# Patient Record
Sex: FEMALE | Race: ASIAN | NOT HISPANIC OR LATINO | Employment: OTHER | URBAN - METROPOLITAN AREA
[De-identification: names, ages, dates, MRNs, and addresses within clinical notes are randomized per-mention and may not be internally consistent; named-entity substitution may affect disease eponyms.]

---

## 2017-02-20 ENCOUNTER — TRANSCRIBE ORDERS (OUTPATIENT)
Dept: ADMINISTRATIVE | Facility: HOSPITAL | Age: 62
End: 2017-02-20

## 2017-02-20 DIAGNOSIS — Z12.31 VISIT FOR SCREENING MAMMOGRAM: Primary | ICD-10-CM

## 2017-03-09 ENCOUNTER — APPOINTMENT (OUTPATIENT)
Dept: MAMMOGRAPHY | Facility: HOSPITAL | Age: 62
End: 2017-03-09

## 2017-03-21 ENCOUNTER — APPOINTMENT (OUTPATIENT)
Dept: MAMMOGRAPHY | Facility: HOSPITAL | Age: 62
End: 2017-03-21

## 2017-03-21 ENCOUNTER — HOSPITAL ENCOUNTER (OUTPATIENT)
Dept: BONE DENSITY | Facility: HOSPITAL | Age: 62
Discharge: HOME | End: 2017-03-21
Admitting: FAMILY MEDICINE

## 2017-03-21 DIAGNOSIS — M81.0 OSTEOPOROSIS: ICD-10-CM

## 2017-03-21 PROCEDURE — 77080 DXA BONE DENSITY AXIAL: CPT

## 2017-03-21 PROCEDURE — 77080 DXA BONE DENSITY AXIAL: CPT | Performed by: RADIOLOGY

## 2017-06-08 ENCOUNTER — TRANSCRIBE ORDERS (OUTPATIENT)
Dept: ADMINISTRATIVE | Facility: HOSPITAL | Age: 62
End: 2017-06-08

## 2017-06-08 DIAGNOSIS — Z12.31 VISIT FOR SCREENING MAMMOGRAM: Primary | ICD-10-CM

## 2017-08-09 ENCOUNTER — HOSPITAL ENCOUNTER (OUTPATIENT)
Dept: MAMMOGRAPHY | Facility: HOSPITAL | Age: 62
Discharge: HOME | End: 2017-08-09
Admitting: FAMILY MEDICINE

## 2017-08-09 DIAGNOSIS — Z12.31 VISIT FOR SCREENING MAMMOGRAM: ICD-10-CM

## 2017-08-09 PROCEDURE — G0202 SCR MAMMO BI INCL CAD: HCPCS

## 2017-08-09 PROCEDURE — 77067 SCR MAMMO BI INCL CAD: CPT | Performed by: RADIOLOGY

## 2017-08-09 PROCEDURE — 77063 BREAST TOMOSYNTHESIS BI: CPT

## 2017-08-09 PROCEDURE — 77063 BREAST TOMOSYNTHESIS BI: CPT | Performed by: RADIOLOGY

## 2018-06-28 PROBLEM — R73.03 PRE-DIABETES: Status: ACTIVE | Noted: 2018-06-28

## 2018-06-28 PROBLEM — E78.5 HYPERLIPIDEMIA: Status: ACTIVE | Noted: 2018-06-28

## 2018-06-28 PROBLEM — I10 ESSENTIAL HYPERTENSION: Status: ACTIVE | Noted: 2018-06-28

## 2018-06-28 PROBLEM — R07.9 CHEST PAIN: Status: ACTIVE | Noted: 2018-06-28

## 2018-06-28 PROBLEM — E66.9 CLASS 1 OBESITY IN ADULT: Status: ACTIVE | Noted: 2018-06-28

## 2018-06-28 PROBLEM — E66.811 CLASS 1 OBESITY IN ADULT: Status: ACTIVE | Noted: 2018-06-28

## 2018-06-28 PROBLEM — F32.A DEPRESSION: Status: ACTIVE | Noted: 2018-06-28

## 2018-06-28 PROBLEM — F41.9 ANXIETY DISORDER: Status: ACTIVE | Noted: 2018-06-28

## 2018-06-28 NOTE — PROGRESS NOTES
Subjective   Toby Hoffman is a 63 y.o. female.  Hayden Curry MD  No referring provider defined for this encounter.      No chief complaint on file.      Patient Active Problem List    Diagnosis   • Essential hypertension [I10]   • Hyperlipidemia [E78.5]   • Pre-diabetes [R73.03]   • Class 1 obesity in adult [E66.9]   • Anxiety disorder [F41.9]   • Depression [F32.9]        History of Present Illness Patient is a 63 year old female of [place] descent who is referred to me for cardiac evaluation in the setting of multiple cardiovascular risk factors.  She has had hypertension and dyslipidemia which has been well treated by PCP.  She also has a family history of coronary artery disease as her father  of a heart attack in his 70s and older sister has stable angina.    Patient is herself fairly active, she walks up to an hour every evening, states that sometimes her walk is fast and sometimes just leisurely.  She also remains active and busy with household chores.  She has no complaints of chest pain or shortness of breath at rest or with exertion.  She has no complaints of heartburn or other angina-like symptoms.  She has no complaints of palpitations dizziness lightheadedness or syncope.  No abdominal pain nausea vomiting diarrhea constipation or urinary symptoms.  No symptoms of stroke.  All other review of systems are negative.    She has anxiety which is well controlled on current medications.  She takes when necessary benzodiazepines for insomnia.      History reviewed.  She is status post hysterectomy.      The following portions of the patient's history were reviewed and updated as appropriate: allergies, current medications, past family history, past medical history, past social history, past surgical history and problem list.    No Known Allergies      Current Outpatient Prescriptions:   •  aspirin 81 MG EC tablet, Take 81 mg by mouth Daily., Disp: , Rfl:   •  atorvastatin (LIPITOR) 10 MG tablet,  "10 mg Daily., Disp: , Rfl:   •  citalopram (CeleXA) 10 MG tablet, 10 mg., Disp: , Rfl:   •  clonazePAM (KlonoPIN) 0.5 MG tablet, 0.5 mg Daily., Disp: , Rfl:   •  lisinopril (PRINIVIL,ZESTRIL) 10 MG tablet, 5 mg Daily., Disp: , Rfl:     Review of Systems   Constitution: Negative.   HENT: Negative.    Eyes: Negative.    Cardiovascular: Negative for chest pain and dyspnea on exertion.   Respiratory: Negative.    Endocrine: Negative.    Hematologic/Lymphatic: Negative.    Skin: Negative.    Musculoskeletal: Negative.    Gastrointestinal: Negative.    Genitourinary: Negative.    Neurological: Negative.    Psychiatric/Behavioral: Negative.    Allergic/Immunologic: Negative.    All other systems reviewed and are negative.      Social History     Social History   • Marital status:      Spouse name: N/A   • Number of children: N/A   • Years of education: N/A     Occupational History   • Not on file.     Social History Main Topics   • Smoking status: Never Smoker   • Smokeless tobacco: Never Used   • Alcohol use No   • Drug use: No   • Sexual activity: Defer     Other Topics Concern   • Not on file     Social History Narrative   • No narrative on file       Family History   Problem Relation Age of Onset   • Breast cancer Neg Hx    • Ovarian cancer Neg Hx        Objective      Blood pressure 116/78, pulse 70, height 160 cm (63\"), weight 77.7 kg (171 lb 6.4 oz).    Physical Exam   Constitutional: She is oriented to person, place, and time. She appears well-developed and well-nourished. No distress.   HENT:   Head: Normocephalic and atraumatic.   Mouth/Throat: Oropharynx is clear and moist.   Eyes: Pupils are equal, round, and reactive to light. No scleral icterus.   Neck: Neck supple. No JVD present. No tracheal deviation present. No thyromegaly present.   Cardiovascular: Normal rate, regular rhythm and normal heart sounds.  Exam reveals no gallop and no friction rub.    No murmur heard.  Pulmonary/Chest: Effort normal " and breath sounds normal. No respiratory distress. She has no wheezes. She has no rales.   Abdominal: Soft. Bowel sounds are normal. She exhibits no distension and no mass. There is no tenderness. There is no rebound and no guarding.   Musculoskeletal: Normal range of motion. She exhibits no edema or deformity.   Lymphadenopathy:     She has no cervical adenopathy.   Neurological: She is alert and oriented to person, place, and time. No cranial nerve deficit.   Skin: Skin is warm and dry. No rash noted. She is not diaphoretic.   Psychiatric: She has a normal mood and affect.   Nursing note and vitals reviewed.        ECG 12 Lead  Date/Time: 6/28/2018 3:29 PM  Performed by: RADHA MURPHY  Authorized by: RADHA MURPHY   Previous ECG: no previous ECG available  Rhythm: sinus rhythm  Rate: normal  Conduction: conduction normal  ST Segments: ST segments normal  T Waves: T waves normal  QRS axis: normal  Clinical impression: normal ECG            Lab Review:   From primary care dated February 20, 2018  Total cholesterol 158, triglycerides 104, HDL 68, LDL 69    Hemoglobin A1c 6.1    TSH 4.23    Vitamin B12 531    Magnesium 2    CBC: WBCs 5.4, hemoglobin 0.8, hematocrit 35.1, platelets 392  Rest of CBC normal    Assessment:   Diagnosis Plan   1. Essential hypertension  Well controlled on current medications.     2. Mixed hyperlipidemia  Well controlled on current medications.     3. Pre-diabetes  Managed with diet and axis size.       Plan:  1. Continue current medications, diet and exercise for management of risk factors.  2. With fair exercise capacity and in the absence of any cardiac symptoms I do not see the need for further testing.  3. She can follow-up with PCP for further management.  4. Due to risk factors she was educated about the symptoms of angina, heart attack and CHF etc. and was advised to seek further consultation if she were to experience any new symptoms.  5. Follow-up as needed.  6. Thank you for  allowing us to participate in the care of your patient.     Scribed for Humberto Suarez MD by Jayden Wise. 6/29/2018  10:51 AM     I, Humberto Suarez MD, personally performed the services described in this documentation as scribed by the above named individual in my presence, and it is both accurate and complete.  6/29/2018  10:57 AM

## 2018-06-29 ENCOUNTER — CONSULT (OUTPATIENT)
Dept: CARDIOLOGY | Facility: CLINIC | Age: 63
End: 2018-06-29

## 2018-06-29 VITALS
HEIGHT: 63 IN | BODY MASS INDEX: 30.37 KG/M2 | WEIGHT: 171.4 LBS | DIASTOLIC BLOOD PRESSURE: 78 MMHG | HEART RATE: 70 BPM | SYSTOLIC BLOOD PRESSURE: 116 MMHG

## 2018-06-29 DIAGNOSIS — E78.2 MIXED HYPERLIPIDEMIA: ICD-10-CM

## 2018-06-29 DIAGNOSIS — I10 ESSENTIAL HYPERTENSION: Primary | ICD-10-CM

## 2018-06-29 DIAGNOSIS — R73.03 PRE-DIABETES: ICD-10-CM

## 2018-06-29 PROCEDURE — 93000 ELECTROCARDIOGRAM COMPLETE: CPT | Performed by: INTERNAL MEDICINE

## 2018-06-29 PROCEDURE — 99214 OFFICE O/P EST MOD 30 MIN: CPT | Performed by: INTERNAL MEDICINE

## 2018-06-29 RX ORDER — CITALOPRAM 10 MG/1
10 TABLET ORAL DAILY
COMMUNITY
Start: 2018-05-14 | End: 2018-12-04 | Stop reason: HOSPADM

## 2018-06-29 RX ORDER — ATORVASTATIN CALCIUM 10 MG/1
10 TABLET, FILM COATED ORAL NIGHTLY
COMMUNITY
Start: 2018-05-14

## 2018-06-29 RX ORDER — CLONAZEPAM 0.5 MG/1
0.5 TABLET ORAL DAILY
COMMUNITY
Start: 2018-05-10 | End: 2018-11-29

## 2018-06-29 RX ORDER — ASPIRIN 81 MG/1
81 TABLET ORAL DAILY
COMMUNITY
End: 2022-05-02

## 2018-06-29 RX ORDER — LISINOPRIL 10 MG/1
10 TABLET ORAL DAILY
COMMUNITY
Start: 2018-05-14

## 2018-08-30 ENCOUNTER — TRANSCRIBE ORDERS (OUTPATIENT)
Dept: ADMINISTRATIVE | Facility: HOSPITAL | Age: 63
End: 2018-08-30

## 2018-08-30 DIAGNOSIS — Z12.31 VISIT FOR SCREENING MAMMOGRAM: Primary | ICD-10-CM

## 2018-10-05 ENCOUNTER — HOSPITAL ENCOUNTER (OUTPATIENT)
Dept: MAMMOGRAPHY | Facility: HOSPITAL | Age: 63
Discharge: HOME | End: 2018-10-05
Admitting: FAMILY MEDICINE

## 2018-10-05 DIAGNOSIS — Z12.31 VISIT FOR SCREENING MAMMOGRAM: ICD-10-CM

## 2018-10-05 PROCEDURE — 77063 BREAST TOMOSYNTHESIS BI: CPT

## 2018-10-05 PROCEDURE — 77067 SCR MAMMO BI INCL CAD: CPT

## 2018-10-05 PROCEDURE — 77063 BREAST TOMOSYNTHESIS BI: CPT | Performed by: RADIOLOGY

## 2018-10-05 PROCEDURE — 77067 SCR MAMMO BI INCL CAD: CPT | Performed by: RADIOLOGY

## 2018-11-16 ENCOUNTER — HOSPITAL ENCOUNTER (EMERGENCY)
Facility: HOSPITAL | Age: 63
Discharge: HOME | End: 2018-11-16
Attending: EMERGENCY MEDICINE | Admitting: EMERGENCY MEDICINE

## 2018-11-16 VITALS
HEIGHT: 61 IN | TEMPERATURE: 98.4 F | RESPIRATION RATE: 16 BRPM | HEART RATE: 84 BPM | BODY MASS INDEX: 32.47 KG/M2 | OXYGEN SATURATION: 96 % | SYSTOLIC BLOOD PRESSURE: 152 MMHG | DIASTOLIC BLOOD PRESSURE: 84 MMHG | WEIGHT: 172 LBS

## 2018-11-16 DIAGNOSIS — I10 ESSENTIAL HYPERTENSION: Primary | ICD-10-CM

## 2018-11-16 DIAGNOSIS — F41.9 ANXIETY: ICD-10-CM

## 2018-11-16 LAB
HOLD SPECIMEN: NORMAL
HOLD SPECIMEN: NORMAL
WHOLE BLOOD HOLD SPECIMEN: NORMAL
WHOLE BLOOD HOLD SPECIMEN: NORMAL

## 2018-11-16 PROCEDURE — 99283 EMERGENCY DEPT VISIT LOW MDM: CPT

## 2018-11-16 RX ORDER — LOSARTAN POTASSIUM 25 MG/1
25 TABLET ORAL 2 TIMES DAILY
Qty: 60 TABLET | Refills: 0 | Status: SHIPPED | OUTPATIENT
Start: 2018-11-16 | End: 2018-11-29

## 2018-11-16 RX ORDER — SODIUM CHLORIDE 0.9 % (FLUSH) 0.9 %
10 SYRINGE (ML) INJECTION AS NEEDED
Status: DISCONTINUED | OUTPATIENT
Start: 2018-11-16 | End: 2018-11-16 | Stop reason: HOSPADM

## 2018-11-16 NOTE — ED PROVIDER NOTES
"    [facility] EMERGENCY DEPARTMENT    eMERGENCY dEPARTMENT eNCOUnter      Pt Name: Toby Hoffman  MRN: [id number]  YOB: 1955  Date of evaluation: 11/16/2018  Provider: Nic Marcum DO    CHIEF COMPLAINT       Chief Complaint   Patient presents with   • Hypertension         HISTORY OF PRESENT ILLNESS  (Location/Symptom, Timing/Onset, Context/Setting, Quality, Duration, Modifying Factors, Severity.)   Toby Hoffman is a 63 y.o. female who presents to the emergency department accompanied by family for evaluation of anxiety and elevated blood pressure readings over the last five to six days. She was started on 10 mg of Celexa by her primary care provider in addition to her regular blood pressure medications (10 mg of Lisinopril and 25 mg of Losartan 25 mg daily) but has noticed blood pressure readings as high as 147/107 and 158/111 at home. Starting yesterday she began complaining of a \"tickling\" sensation over the upper lip and across the forehead. She had also been complaining of sleep disturbances to family who then brought her here for evaluation. There are no other acute complaints at this time.      Nursing notes were reviewed.    REVIEW OF SYSTEMS    (2-9 systems for level 4, 10 or more for level 5)   ROS:  General:  No fevers, no chills, no weakness  Cardiovascular:  No chest pain, no palpitations  Respiratory:  No shortness of breath, no cough, no wheezing  Gastrointestinal:  No pain, no nausea, no vomiting, no diarrhea  Musculoskeletal:  No muscle pain, no joint pain  Skin:  No rash, no easy bruising  Neurologic:  No speech problems, no headache, no extremity numbness, no extremity tingling, no extremity weakness, + facial tingling  Psychiatric:  + anxiety, + sleep disturbance  Genitourinary:  No dysuria, no hematuria    Except as noted above the remainder of the review of systems was reviewed and negative.       PAST MEDICAL HISTORY     Past Medical History:   Diagnosis Date " "  • Anxiety    • Hyperlipidemia    • Hypertension          SURGICAL HISTORY     History reviewed. No pertinent surgical history.      CURRENT MEDICATIONS     No current facility-administered medications for this encounter.     Current Outpatient Medications:   •  aspirin 81 MG EC tablet, Take 81 mg by mouth Daily., Disp: , Rfl:   •  atorvastatin (LIPITOR) 10 MG tablet, 10 mg Daily., Disp: , Rfl:   •  citalopram (CeleXA) 10 MG tablet, 10 mg., Disp: , Rfl:   •  clonazePAM (KlonoPIN) 0.5 MG tablet, 0.5 mg Daily., Disp: , Rfl:   •  lisinopril (PRINIVIL,ZESTRIL) 10 MG tablet, 5 mg Daily., Disp: , Rfl:   •  losartan (COZAAR) 25 MG tablet, Take 1 tablet by mouth 2 (Two) Times a Day., Disp: 60 tablet, Rfl: 0    ALLERGIES     Patient has no known allergies.    FAMILY HISTORY       Family History   Problem Relation Age of Onset   • Breast cancer Neg Hx    • Ovarian cancer Neg Hx           SOCIAL HISTORY       Social History     Socioeconomic History   • Marital status:      Spouse name: Not on file   • Number of children: Not on file   • Years of education: Not on file   • Highest education level: Not on file   Tobacco Use   • Smoking status: Never Smoker   • Smokeless tobacco: Never Used   Substance and Sexual Activity   • Alcohol use: No   • Drug use: No   • Sexual activity: Defer         PHYSICAL EXAM    (up to 7 for level 4, 8 or more for level 5)     Vitals:    11/16/18 1447 11/16/18 1616   BP: 156/81 152/84   BP Location: Left arm    Patient Position: Sitting    Pulse: 76 84   Resp: 18 16   Temp: 98.6 °F (37 °C) 98.4 °F (36.9 °C)   TempSrc: Oral Oral   SpO2: 98% 96%   Weight: 78 kg (172 lb)    Height: 154.9 cm (61\")        Physical Exam  General :Patient is awake, alert, oriented, in no acute distress, nontoxic appearing  HEENT: Pupils are equally round and reactive to light, EOMI, conjunctivae clear, sclerae white, there is no injection no icterus.  Oral mucosa is moist, no exudate. Uvula is midline  Neck: Neck " is supple, full range of motion, trachea midline  Cardiac: Heart regular rate, rhythm, no murmurs, rubs, or gallops  Lungs: Lungs are clear to auscultation, there is no wheezing, rhonchi, or rales. There is no use of accessory muscles.  Chest wall: There is no tenderness to palpation over the chest wall or over ribs  Abdomen: Abdomen is soft, nontender, nondistended. There is no firm or pulsatile masses, no rebound rigidity or guarding.   Musculoskeletal: 5 out of 5 strength in all 4 extremities.  No focal muscle deficits are appreciated  Neuro: Motor intact, sensory intact, level of consciousness is normal, cerebellar function is normal, reflexes are grossly normal. No evidence of incontinence or loss of bowel or bladder function, no saddle anesthesia noted   Dermatology: Skin is warm and dry  Psych: Mentation is grossly normal, cognition is grossly normal. Affect is appropriate.      DIAGNOSTIC RESULTS     EKG: All EKG's are interpreted by the Emergency Department Physician who either signs or Co-signs this chart in the absence of a cardiologist.    No orders to display       RADIOLOGY:   Non-plain film images such as CT, Ultrasound and MRI are read by the radiologist. Plain radiographic images are visualized and preliminarily interpreted by the emergency physician with the below findings:      [] Radiologist's Report Reviewed:  No orders to display         ED BEDSIDE ULTRASOUND:   Performed by ED Physician - none    LABS:    I have reviewed and interpreted all of the currently available lab results from this visit (if applicable):  Results for orders placed or performed during the hospital encounter of 11/16/18   Light Blue Top   Result Value Ref Range    Extra Tube hold for add-on    Green Top (Gel)   Result Value Ref Range    Extra Tube Hold for add-ons.    Lavender Top   Result Value Ref Range    Extra Tube hold for add-on    Gold Top - SST   Result Value Ref Range    Extra Tube Hold for add-ons.         All  "other labs were within normal range or not returned as of this dictation.      EMERGENCY DEPARTMENT COURSE and DIFFERENTIAL DIAGNOSIS/MDM:   Vitals:    Vitals:    11/16/18 1447 11/16/18 1616   BP: 156/81 152/84   BP Location: Left arm    Patient Position: Sitting    Pulse: 76 84   Resp: 18 16   Temp: 98.6 °F (37 °C) 98.4 °F (36.9 °C)   TempSrc: Oral Oral   SpO2: 98% 96%   Weight: 78 kg (172 lb)    Height: 154.9 cm (61\")          Patient with multiple vague complaints, mainly stemming from her underlying anxiety and depression which is not very well controlled.  Also a concern for her blood pressure being elevated in the 150s systolic.  She otherwise asymptomatic.  The family is at the bedside, we discussed the need for her to continue to monitor her blood pressures at home, likely can increase her dose of her and her tensor receptor blocker to 50 mg daily up from 25 mg.  She will also follow her PCP for reevaluation, addition of any anxiety or depression medications which may have less side effects with patient.  Patient family understand the return precautions.  Will follow her PCP as well as psychiatry. The patient will follow-up with their PCP in 1-2 days for reevaluation.  If the patient or family members have any further concerns or patient has any worsening symptoms they will return to the ED for reevaluation.      MEDICATIONS ADMINISTERED IN ED:  Medications - No data to display    PROCEDURES:  Procedures    CRITICAL CARE TIME    Total Critical Care time was 0 minutes, excluding separately reportable procedures.   There was a high probability of clinically significant/life threatening deterioration in the patient's condition which required my urgent intervention.      FINAL IMPRESSION      1. Essential hypertension    2. Anxiety          DISPOSITION/PLAN     ED Disposition     ED Disposition Condition Comment    Discharge Stable           PATIENT REFERRED TO:  Hayden Curry MD  [address] [place] [address]" [address]  [place] [id number] [phone]    In 2 days  For recheck and re-evaluation of your blood pressure and anxiety.  Medication adjustments as needed.    [facility] Emergency Department  [address] [address] [phone]    If symptoms worsen      DISCHARGE MEDICATIONS:     Medication List      START taking these medications    losartan 25 MG tablet  Commonly known as:  COZAAR  Take 1 tablet by mouth 2 (Two) Times a Day.        CONTINUE taking these medications    aspirin 81 MG EC tablet     atorvastatin 10 MG tablet  Commonly known as:  LIPITOR     citalopram 10 MG tablet  Commonly known as:  CeleXA     clonazePAM 0.5 MG tablet  Commonly known as:  KlonoPIN     lisinopril 10 MG tablet  Commonly known as:  PRINIVIL,ZESTRIL          Comment: Please note this report has been produced using speech recognition software and may contain errors related to that system including errors in grammar, punctuation, and spelling, as well as words and phrases that may be inappropriate. If there are any questions or concerns please feel free to contact the dictating provider for clarification.    Nic Marcum DO  Attending Emergency Physician                 Renetta Valenzuela  11/16/18 1707       Nic Marcum DO  11/16/18 8430

## 2018-11-29 ENCOUNTER — APPOINTMENT (OUTPATIENT)
Dept: GENERAL RADIOLOGY | Facility: HOSPITAL | Age: 63
End: 2018-11-29

## 2018-11-29 ENCOUNTER — APPOINTMENT (OUTPATIENT)
Dept: CT IMAGING | Facility: HOSPITAL | Age: 63
End: 2018-11-29

## 2018-11-29 ENCOUNTER — HOSPITAL ENCOUNTER (INPATIENT)
Facility: HOSPITAL | Age: 63
LOS: 5 days | Discharge: HOME | End: 2018-12-04
Attending: EMERGENCY MEDICINE | Admitting: HOSPITALIST

## 2018-11-29 DIAGNOSIS — R53.1 WEAKNESS: ICD-10-CM

## 2018-11-29 DIAGNOSIS — Z86.59 HISTORY OF DEPRESSION: ICD-10-CM

## 2018-11-29 DIAGNOSIS — I10 ESSENTIAL HYPERTENSION: ICD-10-CM

## 2018-11-29 DIAGNOSIS — R53.83 OTHER FATIGUE: ICD-10-CM

## 2018-11-29 DIAGNOSIS — E87.1 HYPONATREMIA: Primary | ICD-10-CM

## 2018-11-29 DIAGNOSIS — E87.8 HYPOCHLOREMIA: ICD-10-CM

## 2018-11-29 LAB
ALBUMIN SERPL-MCNC: 4.47 G/DL (ref 3.2–4.8)
ALBUMIN/GLOB SERPL: 1.6 G/DL (ref 1.5–2.5)
ALP SERPL-CCNC: 112 U/L (ref 25–100)
ALT SERPL W P-5'-P-CCNC: 12 U/L (ref 7–40)
ANION GAP SERPL CALCULATED.3IONS-SCNC: 5 MMOL/L (ref 3–11)
AST SERPL-CCNC: 14 U/L (ref 0–33)
BACTERIA UR QL AUTO: NORMAL /HPF
BASOPHILS # BLD AUTO: 0.03 10*3/MM3 (ref 0–0.2)
BASOPHILS NFR BLD AUTO: 0.4 % (ref 0–1)
BILIRUB SERPL-MCNC: 0.4 MG/DL (ref 0.3–1.2)
BILIRUB UR QL STRIP: NEGATIVE
BUN BLD-MCNC: 12 MG/DL (ref 9–23)
BUN/CREAT SERPL: 19.4 (ref 7–25)
CALCIUM SPEC-SCNC: 9 MG/DL (ref 8.7–10.4)
CHLORIDE SERPL-SCNC: 84 MMOL/L (ref 99–109)
CLARITY UR: CLEAR
CO2 SERPL-SCNC: 25 MMOL/L (ref 20–31)
COLOR UR: YELLOW
CORTIS SERPL-MCNC: 10.8 MCG/DL
CREAT BLD-MCNC: 0.62 MG/DL (ref 0.6–1.3)
CREAT UR-MCNC: 12.4 MG/DL
DEPRECATED RDW RBC AUTO: 38.3 FL (ref 37–54)
EOSINOPHIL # BLD AUTO: 0.22 10*3/MM3 (ref 0–0.3)
EOSINOPHIL NFR BLD AUTO: 2.7 % (ref 0–3)
ERYTHROCYTE [DISTWIDTH] IN BLOOD BY AUTOMATED COUNT: 12.5 % (ref 11.3–14.5)
GFR SERPL CREATININE-BSD FRML MDRD: 117 ML/MIN/1.73
GFR SERPL CREATININE-BSD FRML MDRD: 97 ML/MIN/1.73
GLOBULIN UR ELPH-MCNC: 2.7 GM/DL
GLUCOSE BLD-MCNC: 95 MG/DL (ref 70–100)
GLUCOSE UR STRIP-MCNC: NEGATIVE MG/DL
HCT VFR BLD AUTO: 34 % (ref 34.5–44)
HGB BLD-MCNC: 11.6 G/DL (ref 11.5–15.5)
HGB UR QL STRIP.AUTO: ABNORMAL
HOLD SPECIMEN: NORMAL
HOLD SPECIMEN: NORMAL
IMM GRANULOCYTES # BLD: 0.01 10*3/MM3 (ref 0–0.03)
IMM GRANULOCYTES NFR BLD: 0.1 % (ref 0–0.6)
KETONES UR QL STRIP: NEGATIVE
LEUKOCYTE ESTERASE UR QL STRIP.AUTO: NEGATIVE
LYMPHOCYTES # BLD AUTO: 1.81 10*3/MM3 (ref 0.6–4.8)
LYMPHOCYTES NFR BLD AUTO: 22.3 % (ref 24–44)
MAGNESIUM SERPL-MCNC: 1.7 MG/DL (ref 1.3–2.7)
MCH RBC QN AUTO: 28.8 PG (ref 27–31)
MCHC RBC AUTO-ENTMCNC: 34.1 G/DL (ref 32–36)
MCV RBC AUTO: 84.4 FL (ref 80–99)
MONOCYTES # BLD AUTO: 0.76 10*3/MM3 (ref 0–1)
MONOCYTES NFR BLD AUTO: 9.4 % (ref 0–12)
NEUTROPHILS # BLD AUTO: 5.3 10*3/MM3 (ref 1.5–8.3)
NEUTROPHILS NFR BLD AUTO: 65.2 % (ref 41–71)
NITRITE UR QL STRIP: NEGATIVE
OSMOLALITY SERPL: 245 MOSM/KG (ref 275–295)
OSMOLALITY UR: 138 MOSM/KG (ref 300–1100)
PH UR STRIP.AUTO: 7 [PH] (ref 5–8)
PLATELET # BLD AUTO: 333 10*3/MM3 (ref 150–450)
PMV BLD AUTO: 9.9 FL (ref 6–12)
POTASSIUM BLD-SCNC: 4.2 MMOL/L (ref 3.5–5.5)
PROT SERPL-MCNC: 7.2 G/DL (ref 5.7–8.2)
PROT UR QL STRIP: NEGATIVE
RBC # BLD AUTO: 4.03 10*6/MM3 (ref 3.89–5.14)
RBC # UR: NORMAL /HPF
REF LAB TEST METHOD: NORMAL
SODIUM BLD-SCNC: 114 MMOL/L (ref 132–146)
SODIUM BLD-SCNC: 121 MMOL/L (ref 132–146)
SODIUM UR-SCNC: 22 MMOL/L (ref 30–90)
SP GR UR STRIP: <=1.005 (ref 1–1.03)
SQUAMOUS #/AREA URNS HPF: NORMAL /HPF
T4 FREE SERPL-MCNC: 1.08 NG/DL (ref 0.89–1.76)
TROPONIN I SERPL-MCNC: 0 NG/ML (ref 0–0.07)
TROPONIN I SERPL-MCNC: 0 NG/ML (ref 0–0.07)
TSH SERPL DL<=0.05 MIU/L-ACNC: 3.62 MIU/ML (ref 0.35–5.35)
UROBILINOGEN UR QL STRIP: ABNORMAL
WBC NRBC COR # BLD: 8.12 10*3/MM3 (ref 3.5–10.8)
WBC UR QL AUTO: NORMAL /HPF
WHOLE BLOOD HOLD SPECIMEN: NORMAL
WHOLE BLOOD HOLD SPECIMEN: NORMAL

## 2018-11-29 PROCEDURE — 84443 ASSAY THYROID STIM HORMONE: CPT | Performed by: INTERNAL MEDICINE

## 2018-11-29 PROCEDURE — 81001 URINALYSIS AUTO W/SCOPE: CPT | Performed by: EMERGENCY MEDICINE

## 2018-11-29 PROCEDURE — 84484 ASSAY OF TROPONIN QUANT: CPT

## 2018-11-29 PROCEDURE — 99284 EMERGENCY DEPT VISIT MOD MDM: CPT

## 2018-11-29 PROCEDURE — 93005 ELECTROCARDIOGRAM TRACING: CPT | Performed by: PHYSICIAN ASSISTANT

## 2018-11-29 PROCEDURE — 93005 ELECTROCARDIOGRAM TRACING: CPT | Performed by: EMERGENCY MEDICINE

## 2018-11-29 PROCEDURE — 71046 X-RAY EXAM CHEST 2 VIEWS: CPT

## 2018-11-29 PROCEDURE — 84295 ASSAY OF SERUM SODIUM: CPT | Performed by: NURSE PRACTITIONER

## 2018-11-29 PROCEDURE — 83935 ASSAY OF URINE OSMOLALITY: CPT | Performed by: INTERNAL MEDICINE

## 2018-11-29 PROCEDURE — 83735 ASSAY OF MAGNESIUM: CPT | Performed by: EMERGENCY MEDICINE

## 2018-11-29 PROCEDURE — 82570 ASSAY OF URINE CREATININE: CPT | Performed by: INTERNAL MEDICINE

## 2018-11-29 PROCEDURE — 70450 CT HEAD/BRAIN W/O DYE: CPT

## 2018-11-29 PROCEDURE — 85025 COMPLETE CBC W/AUTO DIFF WBC: CPT | Performed by: EMERGENCY MEDICINE

## 2018-11-29 PROCEDURE — 99291 CRITICAL CARE FIRST HOUR: CPT | Performed by: INTERNAL MEDICINE

## 2018-11-29 PROCEDURE — 84300 ASSAY OF URINE SODIUM: CPT | Performed by: INTERNAL MEDICINE

## 2018-11-29 PROCEDURE — 83930 ASSAY OF BLOOD OSMOLALITY: CPT | Performed by: INTERNAL MEDICINE

## 2018-11-29 PROCEDURE — 82533 TOTAL CORTISOL: CPT | Performed by: INTERNAL MEDICINE

## 2018-11-29 PROCEDURE — 84439 ASSAY OF FREE THYROXINE: CPT | Performed by: INTERNAL MEDICINE

## 2018-11-29 PROCEDURE — 80053 COMPREHEN METABOLIC PANEL: CPT | Performed by: EMERGENCY MEDICINE

## 2018-11-29 RX ORDER — FAMOTIDINE 10 MG/ML
20 INJECTION, SOLUTION INTRAVENOUS EVERY 12 HOURS SCHEDULED
Status: DISCONTINUED | OUTPATIENT
Start: 2018-11-29 | End: 2018-11-30

## 2018-11-29 RX ORDER — SODIUM CHLORIDE 0.9 % (FLUSH) 0.9 %
10 SYRINGE (ML) INJECTION AS NEEDED
Status: DISCONTINUED | OUTPATIENT
Start: 2018-11-29 | End: 2018-12-04 | Stop reason: HOSPADM

## 2018-11-29 RX ORDER — SODIUM CHLORIDE 9 MG/ML
75 INJECTION, SOLUTION INTRAVENOUS CONTINUOUS
Status: DISCONTINUED | OUTPATIENT
Start: 2018-11-29 | End: 2018-11-30

## 2018-11-29 RX ORDER — OMEPRAZOLE 20 MG/1
20 CAPSULE, DELAYED RELEASE ORAL 2 TIMES DAILY
COMMUNITY
End: 2022-05-02

## 2018-11-29 RX ORDER — SODIUM CHLORIDE 0.9 % (FLUSH) 0.9 %
10 SYRINGE (ML) INJECTION AS NEEDED
Status: DISCONTINUED | OUTPATIENT
Start: 2018-11-29 | End: 2018-12-02

## 2018-11-29 RX ORDER — ACETAMINOPHEN 325 MG/1
650 TABLET ORAL EVERY 6 HOURS PRN
Status: DISCONTINUED | OUTPATIENT
Start: 2018-11-29 | End: 2018-12-04 | Stop reason: HOSPADM

## 2018-11-29 RX ORDER — SODIUM CHLORIDE 0.9 % (FLUSH) 0.9 %
3 SYRINGE (ML) INJECTION EVERY 12 HOURS SCHEDULED
Status: DISCONTINUED | OUTPATIENT
Start: 2018-11-29 | End: 2018-12-02

## 2018-11-29 RX ORDER — DEXTROSE MONOHYDRATE 50 MG/ML
100 INJECTION, SOLUTION INTRAVENOUS CONTINUOUS
Status: ACTIVE | OUTPATIENT
Start: 2018-11-29 | End: 2018-11-29

## 2018-11-29 RX ORDER — IPRATROPIUM BROMIDE AND ALBUTEROL SULFATE 2.5; .5 MG/3ML; MG/3ML
3 SOLUTION RESPIRATORY (INHALATION) EVERY 6 HOURS PRN
Status: DISCONTINUED | OUTPATIENT
Start: 2018-11-29 | End: 2018-12-04 | Stop reason: HOSPADM

## 2018-11-29 RX ORDER — ZOLPIDEM TARTRATE 5 MG
5 TABLET ORAL NIGHTLY PRN
COMMUNITY
End: 2022-05-02

## 2018-11-29 RX ORDER — SODIUM CHLORIDE 0.9 % (FLUSH) 0.9 %
3-10 SYRINGE (ML) INJECTION AS NEEDED
Status: DISCONTINUED | OUTPATIENT
Start: 2018-11-29 | End: 2018-12-02

## 2018-11-29 RX ADMIN — DEXTROSE MONOHYDRATE 100 ML/HR: 50 INJECTION, SOLUTION INTRAVENOUS at 21:03

## 2018-11-29 RX ADMIN — SODIUM CHLORIDE 100 ML/HR: 9 INJECTION, SOLUTION INTRAVENOUS at 19:41

## 2018-11-29 RX ADMIN — FAMOTIDINE 20 MG: 10 INJECTION, SOLUTION INTRAVENOUS at 20:00

## 2018-11-29 RX ADMIN — SODIUM CHLORIDE, PRESERVATIVE FREE 3 ML: 5 INJECTION INTRAVENOUS at 20:00

## 2018-11-30 LAB
ALBUMIN SERPL-MCNC: 3.89 G/DL (ref 3.2–4.8)
ALBUMIN/GLOB SERPL: 1.7 G/DL (ref 1.5–2.5)
ALP SERPL-CCNC: 102 U/L (ref 25–100)
ALT SERPL W P-5'-P-CCNC: 13 U/L (ref 7–40)
ANION GAP SERPL CALCULATED.3IONS-SCNC: 4 MMOL/L (ref 3–11)
AST SERPL-CCNC: 14 U/L (ref 0–33)
BASOPHILS # BLD AUTO: 0.02 10*3/MM3 (ref 0–0.2)
BASOPHILS NFR BLD AUTO: 0.3 % (ref 0–1)
BILIRUB SERPL-MCNC: 0.6 MG/DL (ref 0.3–1.2)
BUN BLD-MCNC: 8 MG/DL (ref 9–23)
BUN/CREAT SERPL: 14 (ref 7–25)
CA-I SERPL ISE-MCNC: 1.3 MMOL/L (ref 1.12–1.32)
CALCIUM SPEC-SCNC: 8.5 MG/DL (ref 8.7–10.4)
CHLORIDE SERPL-SCNC: 92 MMOL/L (ref 99–109)
CO2 SERPL-SCNC: 26 MMOL/L (ref 20–31)
CREAT BLD-MCNC: 0.57 MG/DL (ref 0.6–1.3)
DEPRECATED RDW RBC AUTO: 38 FL (ref 37–54)
EOSINOPHIL # BLD AUTO: 0.22 10*3/MM3 (ref 0–0.3)
EOSINOPHIL NFR BLD AUTO: 3.1 % (ref 0–3)
ERYTHROCYTE [DISTWIDTH] IN BLOOD BY AUTOMATED COUNT: 12.4 % (ref 11.3–14.5)
GFR SERPL CREATININE-BSD FRML MDRD: 106 ML/MIN/1.73
GFR SERPL CREATININE-BSD FRML MDRD: 129 ML/MIN/1.73
GLOBULIN UR ELPH-MCNC: 2.3 GM/DL
GLUCOSE BLD-MCNC: 93 MG/DL (ref 70–100)
HCT VFR BLD AUTO: 33.4 % (ref 34.5–44)
HGB BLD-MCNC: 11.5 G/DL (ref 11.5–15.5)
IMM GRANULOCYTES # BLD: 0.01 10*3/MM3 (ref 0–0.03)
IMM GRANULOCYTES NFR BLD: 0.1 % (ref 0–0.6)
LYMPHOCYTES # BLD AUTO: 2.03 10*3/MM3 (ref 0.6–4.8)
LYMPHOCYTES NFR BLD AUTO: 29 % (ref 24–44)
MAGNESIUM SERPL-MCNC: 1.8 MG/DL (ref 1.3–2.7)
MCH RBC QN AUTO: 28.9 PG (ref 27–31)
MCHC RBC AUTO-ENTMCNC: 34.4 G/DL (ref 32–36)
MCV RBC AUTO: 83.9 FL (ref 80–99)
MONOCYTES # BLD AUTO: 0.69 10*3/MM3 (ref 0–1)
MONOCYTES NFR BLD AUTO: 9.8 % (ref 0–12)
NEUTROPHILS # BLD AUTO: 4.05 10*3/MM3 (ref 1.5–8.3)
NEUTROPHILS NFR BLD AUTO: 57.8 % (ref 41–71)
PHOSPHATE SERPL-MCNC: 3.2 MG/DL (ref 2.4–5.1)
PLATELET # BLD AUTO: 321 10*3/MM3 (ref 150–450)
PMV BLD AUTO: 10.1 FL (ref 6–12)
POTASSIUM BLD-SCNC: 4.1 MMOL/L (ref 3.5–5.5)
PROT SERPL-MCNC: 6.2 G/DL (ref 5.7–8.2)
RBC # BLD AUTO: 3.98 10*6/MM3 (ref 3.89–5.14)
SODIUM BLD-SCNC: 119 MMOL/L (ref 132–146)
SODIUM BLD-SCNC: 122 MMOL/L (ref 132–146)
WBC NRBC COR # BLD: 7.01 10*3/MM3 (ref 3.5–10.8)

## 2018-11-30 PROCEDURE — 84295 ASSAY OF SERUM SODIUM: CPT | Performed by: NURSE PRACTITIONER

## 2018-11-30 PROCEDURE — 82330 ASSAY OF CALCIUM: CPT | Performed by: NURSE PRACTITIONER

## 2018-11-30 PROCEDURE — 85025 COMPLETE CBC W/AUTO DIFF WBC: CPT | Performed by: NURSE PRACTITIONER

## 2018-11-30 PROCEDURE — 84100 ASSAY OF PHOSPHORUS: CPT | Performed by: NURSE PRACTITIONER

## 2018-11-30 PROCEDURE — 83735 ASSAY OF MAGNESIUM: CPT | Performed by: NURSE PRACTITIONER

## 2018-11-30 PROCEDURE — 99291 CRITICAL CARE FIRST HOUR: CPT | Performed by: INTERNAL MEDICINE

## 2018-11-30 PROCEDURE — 80053 COMPREHEN METABOLIC PANEL: CPT | Performed by: NURSE PRACTITIONER

## 2018-11-30 RX ORDER — SODIUM CHLORIDE 1000 MG
1 TABLET, SOLUBLE MISCELLANEOUS
Status: DISCONTINUED | OUTPATIENT
Start: 2018-11-30 | End: 2018-12-04 | Stop reason: HOSPADM

## 2018-11-30 RX ORDER — POLYVINYL ALCOHOL 14 MG/ML
1 SOLUTION/ DROPS OPHTHALMIC
Status: DISCONTINUED | OUTPATIENT
Start: 2018-11-30 | End: 2018-12-04 | Stop reason: HOSPADM

## 2018-11-30 RX ORDER — PANTOPRAZOLE SODIUM 40 MG/1
40 TABLET, DELAYED RELEASE ORAL
Status: DISCONTINUED | OUTPATIENT
Start: 2018-11-30 | End: 2018-12-04 | Stop reason: HOSPADM

## 2018-11-30 RX ORDER — ATORVASTATIN CALCIUM 10 MG/1
10 TABLET, FILM COATED ORAL NIGHTLY
Status: DISCONTINUED | OUTPATIENT
Start: 2018-11-30 | End: 2018-12-04 | Stop reason: HOSPADM

## 2018-11-30 RX ORDER — ASPIRIN 81 MG/1
81 TABLET ORAL DAILY
Status: DISCONTINUED | OUTPATIENT
Start: 2018-11-30 | End: 2018-12-04 | Stop reason: HOSPADM

## 2018-11-30 RX ADMIN — POLYVINYL ALCOHOL 1 DROP: 14 SOLUTION/ DROPS OPHTHALMIC at 15:05

## 2018-11-30 RX ADMIN — Medication 1 G: at 13:28

## 2018-11-30 RX ADMIN — POLYVINYL ALCOHOL 1 DROP: 14 SOLUTION/ DROPS OPHTHALMIC at 17:28

## 2018-11-30 RX ADMIN — SODIUM CHLORIDE, PRESERVATIVE FREE 3 ML: 5 INJECTION INTRAVENOUS at 20:45

## 2018-11-30 RX ADMIN — ACETAMINOPHEN 650 MG: 325 TABLET ORAL at 10:40

## 2018-11-30 RX ADMIN — Medication 1 G: at 17:28

## 2018-11-30 RX ADMIN — FAMOTIDINE 20 MG: 10 INJECTION, SOLUTION INTRAVENOUS at 08:35

## 2018-11-30 RX ADMIN — ASPIRIN 81 MG: 81 TABLET, COATED ORAL at 13:28

## 2018-11-30 RX ADMIN — ATORVASTATIN CALCIUM 10 MG: 10 TABLET, FILM COATED ORAL at 20:45

## 2018-11-30 RX ADMIN — PANTOPRAZOLE SODIUM 40 MG: 40 TABLET, DELAYED RELEASE ORAL at 13:28

## 2018-11-30 RX ADMIN — SODIUM CHLORIDE, PRESERVATIVE FREE 3 ML: 5 INJECTION INTRAVENOUS at 09:24

## 2018-11-30 RX ADMIN — ACETAMINOPHEN 650 MG: 325 TABLET ORAL at 17:42

## 2018-12-01 LAB
SODIUM BLD-SCNC: 120 MMOL/L (ref 132–146)
SODIUM BLD-SCNC: 122 MMOL/L (ref 132–146)
SODIUM BLD-SCNC: 123 MMOL/L (ref 132–146)

## 2018-12-01 PROCEDURE — 84295 ASSAY OF SERUM SODIUM: CPT | Performed by: NURSE PRACTITIONER

## 2018-12-01 PROCEDURE — 84295 ASSAY OF SERUM SODIUM: CPT | Performed by: INTERNAL MEDICINE

## 2018-12-01 PROCEDURE — 99233 SBSQ HOSP IP/OBS HIGH 50: CPT | Performed by: INTERNAL MEDICINE

## 2018-12-01 RX ORDER — ALPRAZOLAM 0.25 MG/1
0.25 TABLET ORAL 4 TIMES DAILY PRN
Status: DISCONTINUED | OUTPATIENT
Start: 2018-12-01 | End: 2018-12-04 | Stop reason: HOSPADM

## 2018-12-01 RX ADMIN — PANTOPRAZOLE SODIUM 40 MG: 40 TABLET, DELAYED RELEASE ORAL at 06:04

## 2018-12-01 RX ADMIN — ALPRAZOLAM 0.25 MG: 0.25 TABLET ORAL at 16:45

## 2018-12-01 RX ADMIN — ASPIRIN 81 MG: 81 TABLET, COATED ORAL at 09:32

## 2018-12-01 RX ADMIN — ATORVASTATIN CALCIUM 10 MG: 10 TABLET, FILM COATED ORAL at 20:26

## 2018-12-01 RX ADMIN — Medication 1 G: at 18:17

## 2018-12-01 RX ADMIN — Medication 1 G: at 07:50

## 2018-12-01 RX ADMIN — SODIUM CHLORIDE, PRESERVATIVE FREE 3 ML: 5 INJECTION INTRAVENOUS at 20:28

## 2018-12-01 RX ADMIN — Medication 1 G: at 12:18

## 2018-12-01 RX ADMIN — ACETAMINOPHEN 650 MG: 325 TABLET ORAL at 06:06

## 2018-12-01 RX ADMIN — ACETAMINOPHEN 650 MG: 325 TABLET ORAL at 14:31

## 2018-12-01 RX ADMIN — POLYVINYL ALCOHOL 1 DROP: 14 SOLUTION/ DROPS OPHTHALMIC at 16:53

## 2018-12-01 NOTE — PLAN OF CARE
Problem: Fall Risk (Adult)  Goal: Absence of Fall  Outcome: Outcome(s) achieved Date Met: 12/01/18      Problem: Patient Care Overview  Goal: Plan of Care Review  Outcome: Ongoing (interventions implemented as appropriate)   12/01/18 0426   Plan of Care Review   Progress improving   OTHER   Outcome Summary Na+ level increasing. No signs of confusion. Adequate output. VSS. Will continue to monitor.     Goal: Individualization and Mutuality  Outcome: Ongoing (interventions implemented as appropriate)    Goal: Discharge Needs Assessment  Outcome: Ongoing (interventions implemented as appropriate)    Goal: Interprofessional Rounds/Family Conf  Outcome: Ongoing (interventions implemented as appropriate)      Problem: Confusion, Acute (Adult)  Goal: Cognitive/Functional Impairments Minimized  Outcome: Outcome(s) achieved Date Met: 12/01/18    Goal: Safety  Outcome: Outcome(s) achieved Date Met: 12/01/18

## 2018-12-01 NOTE — PROGRESS NOTES
"INTENSIVIST NOTE     Hospital Day: 2      Ms. Toby Hoffman, 63 y.o. female is followed for:   Hyponatremia        SUBJECTIVE     Interval history:    Sodium remains around 120.  She remains awake and alert.  Afebrile.  Fluid balance +900 mL.    The patient's relevant past medical, surgical and social history were reviewed and updated in Epic as appropriate.       OBJECTIVE     Vital Sign Min/Max for last 24 hours  Temp  Min: 97.7 °F (36.5 °C)  Max: 98 °F (36.7 °C)   BP  Min: 91/64  Max: 138/76   Pulse  Min: 67  Max: 104   Resp  Min: 14  Max: 18   SpO2  Min: 94 %  Max: 100 %   No Data Recorded   No Data Recorded      Intake/Output Summary (Last 24 hours) at 12/1/2018 1658  Last data filed at 12/1/2018 1500  Gross per 24 hour   Intake 1173 ml   Output 275 ml   Net 898 ml      Flowsheet Rows      First Filed Value   Admission Height  160 cm (63\") Documented at 11/29/2018 1420   Admission Weight  78.5 kg (173 lb) Documented at 11/29/2018 1420             11/29/18  1420   Weight: 78.5 kg (173 lb)            Objective:  General Appearance:  In no acute distress.    Vital signs: (most recent): Blood pressure 123/79, pulse 94, temperature 98 °F (36.7 °C), temperature source Oral, resp. rate 18, height 160 cm (63\"), weight 78.5 kg (173 lb), SpO2 95 %.    HEENT: Normal HEENT exam.    Lungs:  Normal effort and normal respiratory rate.  Breath sounds clear to auscultation.  She is not in respiratory distress.  No rales, wheezes or rhonchi.    Heart: Normal rate.  Regular rhythm.  S1 normal and S2 normal.  No murmur, gallop or friction rub.   Chest: Symmetric chest wall expansion.   Abdomen: Abdomen is soft and non-distended.  Bowel sounds are normal.   There is no abdominal tenderness.   There is no mass. There is no splenomegaly. There is no hepatomegaly.   Extremities: There is no deformity or dependent edema.    Neurological: Patient is alert and oriented to person, place and time.    Pupils:  Pupils are equal, round, and " reactive to light.    Skin:  Warm and dry.              I reviewed the patient's new clinical results.  I reviewed the patient's new imaging results/reports including actual images and agree with reports.      Chest X-Ray:  NAD    INFUSIONS       Results from last 7 days   Lab Units  11/30/18   0056  11/29/18   1518   WBC 10*3/mm3  7.01  8.12   HEMOGLOBIN g/dL  11.5  11.6   HEMATOCRIT %  33.4*  34.0*   PLATELETS 10*3/mm3  321  333     Results from last 7 days   Lab Units  12/01/18   1140  12/01/18   0747  12/01/18   0348   11/30/18   0056   11/29/18   1518   SODIUM mmol/L  120*  122*  122*   < >  122*   < >  114*   POTASSIUM mmol/L   --    --    --    --   4.1   --   4.2   CHLORIDE mmol/L   --    --    --    --   92*   --   84*   CO2 mmol/L   --    --    --    --   26.0   --   25.0   BUN mg/dL   --    --    --    --   8*   --   12   GLUCOSE mg/dL   --    --    --    --   93   --   95   CREATININE mg/dL   --    --    --    --   0.57*   --   0.62   MAGNESIUM mg/dL   --    --    --    --   1.8   --   1.7   CALCIUM mg/dL   --    --    --    --   8.5*   --   9.0    < > = values in this interval not displayed.                 [facility] Ventilation:      I reviewed the patient's medications.    Assessment/Plan   ASSESSMENT      Active Hospital Problems    Diagnosis   • **Hyponatremia   • Essential hypertension   • Pre-diabetes   • Depression   • Anxiety disorder   • Hyperlipidemia         63-year-old female with history of hypertension, prediabetes, dyslipidemia, depression, and anxiety on SSRI and TCA admitted on 11/29 with symptomatic hyponatremia.  SSRI implicated         PLAN     1. Fluid restriction   2. Salt tablets   3. Serial sodium levels to avoid excessive increase   4. Monitor in ICU until sodium level appropriately improved          I discussed the patient's findings and my recommendations with patient, family and nursing staff     Plan of care and goals reviewed with multidisciplinary team at daily  rounds.    Edvin Fernandes MD  Pulmonary and Critical Care Medicine  12/01/18 4:58 PM

## 2018-12-01 NOTE — PLAN OF CARE
Problem: Patient Care Overview  Goal: Plan of Care Review  Outcome: Ongoing (interventions implemented as appropriate)   12/01/18 1720   OTHER   Outcome Summary Sodium level stable. No changes to PO sodium therapy. Minimum but adequate urine. FR 1500ml/day. Xanax PO prn for anxiety. Up to chair, ambulated in hallway. Personal care completed.   Coping/Psychosocial   Plan of Care Reviewed With patient;family     Goal: Interprofessional Rounds/Family Conf  Outcome: Ongoing (interventions implemented as appropriate)

## 2018-12-02 LAB
ALBUMIN SERPL-MCNC: 3.87 G/DL (ref 3.2–4.8)
ANION GAP SERPL CALCULATED.3IONS-SCNC: 6 MMOL/L (ref 3–11)
BASOPHILS # BLD AUTO: 0.04 10*3/MM3 (ref 0–0.2)
BASOPHILS NFR BLD AUTO: 0.6 % (ref 0–1)
BUN BLD-MCNC: 9 MG/DL (ref 9–23)
BUN/CREAT SERPL: 13.8 (ref 7–25)
CALCIUM SPEC-SCNC: 8.9 MG/DL (ref 8.7–10.4)
CHLORIDE SERPL-SCNC: 99 MMOL/L (ref 99–109)
CO2 SERPL-SCNC: 23 MMOL/L (ref 20–31)
CREAT BLD-MCNC: 0.65 MG/DL (ref 0.6–1.3)
DEPRECATED RDW RBC AUTO: 40.7 FL (ref 37–54)
EOSINOPHIL # BLD AUTO: 0.29 10*3/MM3 (ref 0–0.3)
EOSINOPHIL NFR BLD AUTO: 4.6 % (ref 0–3)
ERYTHROCYTE [DISTWIDTH] IN BLOOD BY AUTOMATED COUNT: 13 % (ref 11.3–14.5)
GFR SERPL CREATININE-BSD FRML MDRD: 111 ML/MIN/1.73
GFR SERPL CREATININE-BSD FRML MDRD: 91 ML/MIN/1.73
GLUCOSE BLD-MCNC: 101 MG/DL (ref 70–100)
HCT VFR BLD AUTO: 34.1 % (ref 34.5–44)
HGB BLD-MCNC: 11.3 G/DL (ref 11.5–15.5)
IMM GRANULOCYTES # BLD: 0.01 10*3/MM3 (ref 0–0.03)
IMM GRANULOCYTES NFR BLD: 0.2 % (ref 0–0.6)
LYMPHOCYTES # BLD AUTO: 1.88 10*3/MM3 (ref 0.6–4.8)
LYMPHOCYTES NFR BLD AUTO: 30.1 % (ref 24–44)
MAGNESIUM SERPL-MCNC: 1.8 MG/DL (ref 1.3–2.7)
MCH RBC QN AUTO: 28.5 PG (ref 27–31)
MCHC RBC AUTO-ENTMCNC: 33.1 G/DL (ref 32–36)
MCV RBC AUTO: 85.9 FL (ref 80–99)
MONOCYTES # BLD AUTO: 0.65 10*3/MM3 (ref 0–1)
MONOCYTES NFR BLD AUTO: 10.4 % (ref 0–12)
NEUTROPHILS # BLD AUTO: 3.39 10*3/MM3 (ref 1.5–8.3)
NEUTROPHILS NFR BLD AUTO: 54.3 % (ref 41–71)
PHOSPHATE SERPL-MCNC: 4.5 MG/DL (ref 2.4–5.1)
PLATELET # BLD AUTO: 325 10*3/MM3 (ref 150–450)
PMV BLD AUTO: 9.9 FL (ref 6–12)
POTASSIUM BLD-SCNC: 4.7 MMOL/L (ref 3.5–5.5)
RBC # BLD AUTO: 3.97 10*6/MM3 (ref 3.89–5.14)
SODIUM BLD-SCNC: 126 MMOL/L (ref 132–146)
SODIUM BLD-SCNC: 128 MMOL/L (ref 132–146)
WBC NRBC COR # BLD: 6.25 10*3/MM3 (ref 3.5–10.8)

## 2018-12-02 PROCEDURE — 83735 ASSAY OF MAGNESIUM: CPT | Performed by: INTERNAL MEDICINE

## 2018-12-02 PROCEDURE — 84295 ASSAY OF SERUM SODIUM: CPT | Performed by: INTERNAL MEDICINE

## 2018-12-02 PROCEDURE — 85025 COMPLETE CBC W/AUTO DIFF WBC: CPT | Performed by: INTERNAL MEDICINE

## 2018-12-02 PROCEDURE — 80069 RENAL FUNCTION PANEL: CPT | Performed by: INTERNAL MEDICINE

## 2018-12-02 PROCEDURE — 99232 SBSQ HOSP IP/OBS MODERATE 35: CPT | Performed by: INTERNAL MEDICINE

## 2018-12-02 RX ORDER — ZOLPIDEM TARTRATE 5 MG/1
5 TABLET ORAL NIGHTLY PRN
Status: DISCONTINUED | OUTPATIENT
Start: 2018-12-02 | End: 2018-12-04 | Stop reason: HOSPADM

## 2018-12-02 RX ORDER — BISACODYL 5 MG/1
10 TABLET, DELAYED RELEASE ORAL DAILY PRN
Status: DISCONTINUED | OUTPATIENT
Start: 2018-12-02 | End: 2018-12-04 | Stop reason: HOSPADM

## 2018-12-02 RX ORDER — MAGNESIUM SULFATE HEPTAHYDRATE 40 MG/ML
4 INJECTION, SOLUTION INTRAVENOUS ONCE
Status: COMPLETED | OUTPATIENT
Start: 2018-12-02 | End: 2018-12-02

## 2018-12-02 RX ADMIN — ATORVASTATIN CALCIUM 10 MG: 10 TABLET, FILM COATED ORAL at 20:43

## 2018-12-02 RX ADMIN — MAGNESIUM SULFATE IN WATER 4 G: 40 INJECTION, SOLUTION INTRAVENOUS at 11:56

## 2018-12-02 RX ADMIN — PANTOPRAZOLE SODIUM 40 MG: 40 TABLET, DELAYED RELEASE ORAL at 05:58

## 2018-12-02 RX ADMIN — Medication 1 G: at 08:17

## 2018-12-02 RX ADMIN — Medication 1 G: at 11:56

## 2018-12-02 RX ADMIN — Medication 1 G: at 17:19

## 2018-12-02 RX ADMIN — ALPRAZOLAM 0.25 MG: 0.25 TABLET ORAL at 01:18

## 2018-12-02 RX ADMIN — ZOLPIDEM TARTRATE 5 MG: 5 TABLET ORAL at 20:44

## 2018-12-02 RX ADMIN — ASPIRIN 81 MG: 81 TABLET, COATED ORAL at 08:17

## 2018-12-02 RX ADMIN — BISACODYL 10 MG: 5 TABLET, COATED ORAL at 20:43

## 2018-12-02 RX ADMIN — ACETAMINOPHEN 650 MG: 325 TABLET ORAL at 11:56

## 2018-12-02 NOTE — PLAN OF CARE
Problem: Patient Care Overview  Goal: Plan of Care Review  Outcome: Ongoing (interventions implemented as appropriate)   12/02/18 9652   Plan of Care Review   Progress improving   OTHER   Outcome Summary Na+ levels increasing with po sodium tabs. VSS. One episode of anxiety tonight. PRN Xanax given with good results. Will continue to monitor.     Goal: Individualization and Mutuality  Outcome: Ongoing (interventions implemented as appropriate)    Goal: Discharge Needs Assessment  Outcome: Ongoing (interventions implemented as appropriate)    Goal: Interprofessional Rounds/Family Conf  Outcome: Ongoing (interventions implemented as appropriate)

## 2018-12-02 NOTE — PLAN OF CARE
Problem: Patient Care Overview  Goal: Plan of Care Review  Outcome: Ongoing (interventions implemented as appropriate)   12/02/18 0865   Plan of Care Review   Progress improving   OTHER   Outcome Summary Patient alert and oriented throughout the day. Family at bedside to help translate. Tylenol given this afternoon for headache. No reports of anxiety. Na+ stable at 128. Next draw at 1800. Transferred to 5B Room 545. Report given to ERYN Christine.    Coping/Psychosocial   Plan of Care Reviewed With patient;family

## 2018-12-02 NOTE — PROGRESS NOTES
"INTENSIVIST NOTE     Hospital Day: 3      Ms. Toby Hoffman, 63 y.o. female is followed for:   Hyponatremia        SUBJECTIVE     Interval history:    Sodium level now 128.  Mental status intact.  Afebrile.  Complaining of lack of sleep and lack of bowel movement    The patient's relevant past medical, surgical and social history were reviewed and updated in Epic as appropriate.       OBJECTIVE     Vital Sign Min/Max for last 24 hours  Temp  Min: 97.5 °F (36.4 °C)  Max: 98.4 °F (36.9 °C)   BP  Min: 82/55  Max: 146/73   Pulse  Min: 83  Max: 106   Resp  Min: 16  Max: 20   SpO2  Min: 93 %  Max: 100 %   No Data Recorded   No Data Recorded      Intake/Output Summary (Last 24 hours) at 12/2/2018 1658  Last data filed at 12/2/2018 1400  Gross per 24 hour   Intake 1315 ml   Output 400 ml   Net 915 ml      Flowsheet Rows      First Filed Value   Admission Height  160 cm (63\") Documented at 11/29/2018 1420   Admission Weight  78.5 kg (173 lb) Documented at 11/29/2018 1420             11/29/18  1420   Weight: 78.5 kg (173 lb)            Objective:  General Appearance:  In no acute distress.    Vital signs: (most recent): Blood pressure 146/73, pulse 90, temperature 97.5 °F (36.4 °C), temperature source Axillary, resp. rate 16, height 160 cm (62.99\"), weight 78.5 kg (173 lb), SpO2 99 %.    HEENT: Normal HEENT exam.    Lungs:  Normal effort and normal respiratory rate.  Breath sounds clear to auscultation.  She is not in respiratory distress.  No rales, wheezes or rhonchi.    Heart: Normal rate.  Regular rhythm.  S1 normal and S2 normal.  No murmur, gallop or friction rub.   Chest: Symmetric chest wall expansion.   Abdomen: Abdomen is soft and non-distended.  Bowel sounds are normal.   There is no abdominal tenderness.   There is no mass. There is no splenomegaly. There is no hepatomegaly.   Extremities: There is no deformity or dependent edema.    Neurological: Patient is alert and oriented to person, place and time.    Pupils: "  Pupils are equal, round, and reactive to light.    Skin:  Warm and dry.              I reviewed the patient's new clinical results.  I reviewed the patient's new imaging results/reports including actual images and agree with reports.      Chest X-Ray:  NAD    INFUSIONS       Results from last 7 days   Lab Units  12/02/18   0551  11/30/18   0056  11/29/18   1518   WBC 10*3/mm3  6.25  7.01  8.12   HEMOGLOBIN g/dL  11.3*  11.5  11.6   HEMATOCRIT %  34.1*  33.4*  34.0*   PLATELETS 10*3/mm3  325  321  333     Results from last 7 days   Lab Units  12/02/18   1221  12/02/18   0551  12/01/18   2359   11/30/18   0056   11/29/18   1518   SODIUM mmol/L  128*  128*  126*   < >  122*   < >  114*   POTASSIUM mmol/L   --   4.7   --    --   4.1   --   4.2   CHLORIDE mmol/L   --   99   --    --   92*   --   84*   CO2 mmol/L   --   23.0   --    --   26.0   --   25.0   BUN mg/dL   --   9   --    --   8*   --   12   GLUCOSE mg/dL   --   101*   --    --   93   --   95   CREATININE mg/dL   --   0.65   --    --   0.57*   --   0.62   MAGNESIUM mg/dL   --   1.8   --    --   1.8   --   1.7   CALCIUM mg/dL   --   8.9   --    --   8.5*   --   9.0    < > = values in this interval not displayed.                 [facility]:      I reviewed the patient's medications.    Assessment/Plan   ASSESSMENT      Active Hospital Problems    Diagnosis   • **Hyponatremia   • Essential hypertension   • Pre-diabetes   • Depression   • Anxiety disorder   • Hyperlipidemia         63-year-old female with history of hypertension, prediabetes, dyslipidemia, depression, and anxiety on SSRI and TCA admitted on 11/29 with symptomatic hyponatremia.  SSRI implicated         PLAN     1. Fluid restriction   2. Salt tablets   3. Serial sodium levels to avoid excessive increase   4. Okay to telemetry now that sodium at a more reasonable level   5. Would assume alternative to SSRI should be considered as an outpatient          I discussed the patient's findings and my  recommendations with patient, family and nursing staff     Plan of care and goals reviewed with multidisciplinary team at daily rounds.    Edvin Fernandes MD  Pulmonary and Critical Care Medicine  12/02/18 4:58 PM

## 2018-12-03 LAB
ANION GAP SERPL CALCULATED.3IONS-SCNC: 6 MMOL/L (ref 3–11)
BASOPHILS # BLD AUTO: 0.03 10*3/MM3 (ref 0–0.2)
BASOPHILS NFR BLD AUTO: 0.4 % (ref 0–1)
BUN BLD-MCNC: 11 MG/DL (ref 9–23)
BUN/CREAT SERPL: 15.7 (ref 7–25)
CALCIUM SPEC-SCNC: 8.5 MG/DL (ref 8.7–10.4)
CHLORIDE SERPL-SCNC: 101 MMOL/L (ref 99–109)
CO2 SERPL-SCNC: 22 MMOL/L (ref 20–31)
CREAT BLD-MCNC: 0.7 MG/DL (ref 0.6–1.3)
DEPRECATED RDW RBC AUTO: 41.9 FL (ref 37–54)
EOSINOPHIL # BLD AUTO: 0.41 10*3/MM3 (ref 0–0.3)
EOSINOPHIL NFR BLD AUTO: 5.7 % (ref 0–3)
ERYTHROCYTE [DISTWIDTH] IN BLOOD BY AUTOMATED COUNT: 13.2 % (ref 11.3–14.5)
GFR SERPL CREATININE-BSD FRML MDRD: 102 ML/MIN/1.73
GFR SERPL CREATININE-BSD FRML MDRD: 84 ML/MIN/1.73
GLUCOSE BLD-MCNC: 98 MG/DL (ref 70–100)
HCT VFR BLD AUTO: 33.4 % (ref 34.5–44)
HGB BLD-MCNC: 11.1 G/DL (ref 11.5–15.5)
IMM GRANULOCYTES # BLD: 0.01 10*3/MM3 (ref 0–0.03)
IMM GRANULOCYTES NFR BLD: 0.1 % (ref 0–0.6)
LYMPHOCYTES # BLD AUTO: 1.84 10*3/MM3 (ref 0.6–4.8)
LYMPHOCYTES NFR BLD AUTO: 25.6 % (ref 24–44)
MAGNESIUM SERPL-MCNC: 2.3 MG/DL (ref 1.3–2.7)
MCH RBC QN AUTO: 28.8 PG (ref 27–31)
MCHC RBC AUTO-ENTMCNC: 33.2 G/DL (ref 32–36)
MCV RBC AUTO: 86.8 FL (ref 80–99)
MONOCYTES # BLD AUTO: 0.6 10*3/MM3 (ref 0–1)
MONOCYTES NFR BLD AUTO: 8.3 % (ref 0–12)
NEUTROPHILS # BLD AUTO: 4.31 10*3/MM3 (ref 1.5–8.3)
NEUTROPHILS NFR BLD AUTO: 60 % (ref 41–71)
PLATELET # BLD AUTO: 346 10*3/MM3 (ref 150–450)
PMV BLD AUTO: 9.8 FL (ref 6–12)
POTASSIUM BLD-SCNC: 4.4 MMOL/L (ref 3.5–5.5)
RBC # BLD AUTO: 3.85 10*6/MM3 (ref 3.89–5.14)
SODIUM BLD-SCNC: 128 MMOL/L (ref 132–146)
SODIUM BLD-SCNC: 129 MMOL/L (ref 132–146)
SODIUM BLD-SCNC: 129 MMOL/L (ref 132–146)
WBC NRBC COR # BLD: 7.19 10*3/MM3 (ref 3.5–10.8)

## 2018-12-03 PROCEDURE — 85025 COMPLETE CBC W/AUTO DIFF WBC: CPT | Performed by: INTERNAL MEDICINE

## 2018-12-03 PROCEDURE — 80048 BASIC METABOLIC PNL TOTAL CA: CPT | Performed by: INTERNAL MEDICINE

## 2018-12-03 PROCEDURE — 99232 SBSQ HOSP IP/OBS MODERATE 35: CPT | Performed by: HOSPITALIST

## 2018-12-03 PROCEDURE — 84295 ASSAY OF SERUM SODIUM: CPT | Performed by: HOSPITALIST

## 2018-12-03 PROCEDURE — 83735 ASSAY OF MAGNESIUM: CPT

## 2018-12-03 RX ORDER — DOCUSATE SODIUM 100 MG/1
100 CAPSULE, LIQUID FILLED ORAL 2 TIMES DAILY
Status: DISCONTINUED | OUTPATIENT
Start: 2018-12-03 | End: 2018-12-04 | Stop reason: HOSPADM

## 2018-12-03 RX ADMIN — METOPROLOL TARTRATE 25 MG: 25 TABLET ORAL at 21:16

## 2018-12-03 RX ADMIN — Medication 1 G: at 08:44

## 2018-12-03 RX ADMIN — DOCUSATE SODIUM 100 MG: 100 CAPSULE, LIQUID FILLED ORAL at 11:28

## 2018-12-03 RX ADMIN — ZOLPIDEM TARTRATE 5 MG: 5 TABLET ORAL at 21:16

## 2018-12-03 RX ADMIN — ATORVASTATIN CALCIUM 10 MG: 10 TABLET, FILM COATED ORAL at 21:17

## 2018-12-03 RX ADMIN — Medication 1 G: at 11:28

## 2018-12-03 RX ADMIN — ASPIRIN 81 MG: 81 TABLET, COATED ORAL at 08:44

## 2018-12-03 RX ADMIN — PANTOPRAZOLE SODIUM 40 MG: 40 TABLET, DELAYED RELEASE ORAL at 06:21

## 2018-12-03 RX ADMIN — ACETAMINOPHEN 650 MG: 325 TABLET ORAL at 10:45

## 2018-12-03 RX ADMIN — ALPRAZOLAM 0.25 MG: 0.25 TABLET ORAL at 11:31

## 2018-12-03 RX ADMIN — METOPROLOL TARTRATE 25 MG: 25 TABLET ORAL at 08:44

## 2018-12-03 RX ADMIN — DOCUSATE SODIUM 100 MG: 100 CAPSULE, LIQUID FILLED ORAL at 21:16

## 2018-12-03 RX ADMIN — Medication 1 G: at 18:20

## 2018-12-03 NOTE — PAYOR COMM NOTE
"Melvi Rubin RN Utilization Review [phone]  Fax # [phone]        Notification of admission faxed in on 11/30/18.  Authorization still pending.  Please call or fax with inpatient authorization.     Toby Hoffman (63 y.o. Female)     Date of Birth Social Security Number Address Home Phone MRN    1955  [address] [facility] [address] [phone] [id number]    Christianity Marital Status          None        Admission Date Admission Type Admitting Provider Attending Provider Department, Room/Bed    11/29/18 Emergency Michael Stapleton MD Repass, Gregory L, MD [facility] 5B, N545/1    Discharge Date Discharge Disposition Discharge Destination                       Attending Provider:  Michael Stapleton MD    Allergies:  No Known Allergies    Isolation:  None   Infection:  None   Code Status:  CPR    Ht:  160 cm (62.99\")   Wt:  78.5 kg (173 lb)    Admission Cmt:  None   Principal Problem:  Hyponatremia [E87.1]                 Active Insurance as of 11/29/2018     Primary Coverage     Payor Plan Insurance Group Employer/Plan Group    PASSPORT PASSPORT MEDICAID     Payor Plan Address Payor Plan Phone Number Payor Plan Fax Number Effective Dates    [address] [phone]  11/1/1997 - None Entered    [facility] [id number]       Subscriber Name Subscriber Birth Date Member ID       TOBY HOFFMAN 1955 [id number]                 Emergency Contacts      (Rel.) Home Phone Work Phone Mobile Phone    Umang Hoffman (Son) -- -- [phone]    Cory Hoffman (Son) -- -- [phone]               History & Physical      Frank Pettit MD at 11/29/2018  6:39 PM            CRITICAL CARE ADMISSION NOTE    Chief Complaint     Hyponatremia        Hyponatremia    Essential hypertension    Pre-diabetes    Anxiety disorder    Depression    Hyperlipidemia      History of Present Illness     Toby Hoffman is a 63 y.o. female, nonsmoker, who recently was seen by her " primary care physician and had some of her medications changed.  She was initially seen for lethargy, fatigue, and sleep difficulties and underwent lab work which revealed hyponatremia, and a sodium of 114.  She was instructed to go immediately to the emergency department and upon arrival her daughter noted that the patient experienced some confusion and altered mental status.  She was previously using Celexa and Elavil but had been told to discontinue this today due to her hyponatremia.  The Elavil had been increased recently per patient's family. No other medication changes or over-the-counter medications have been utilized. Additionally the patient reports neck pain, headache, palpitations, dysuria and frequency, and cold hands.  She also complains of difficulty with constipation.  She denies use of alcohol, tobacco, or illicit drugs.      Problem List, Surgical History, Family, Social History, and ROS     Patient Active Problem List   Diagnosis   • Essential hypertension   • Hyperlipidemia   • Pre-diabetes   • Class 1 obesity in adult   • Anxiety disorder   • Depression   • Hyponatremia     History reviewed. No pertinent surgical history.    No Known Allergies  No current facility-administered medications on file prior to encounter.      Current Outpatient Medications on File Prior to Encounter   Medication Sig   • aspirin 81 MG EC tablet Take 81 mg by mouth Daily.   • atorvastatin (LIPITOR) 10 MG tablet Take 10 mg by mouth Every Night.   • CALCIUM PO Take 1 tablet by mouth Daily.   • citalopram (CeleXA) 10 MG tablet Take 10 mg by mouth Daily.   • lisinopril (PRINIVIL,ZESTRIL) 10 MG tablet Take 10 mg by mouth Daily.   • metoprolol tartrate (LOPRESSOR) 25 MG tablet Take 25 mg by mouth 2 (Two) Times a Day.   • nystatin (MYCOSTATIN) 688482 UNIT/ML suspension Swish and swallow 500,000 Units 4 (Four) Times a Day.   • omeprazole (priLOSEC) 20 MG capsule Take 20 mg by mouth 2 (Two) Times a Day.   • zolpidem (AMBIEN) 5  "MG tablet Take 5 mg by mouth At Night As Needed for Sleep.   • [DISCONTINUED] clonazePAM (KlonoPIN) 0.5 MG tablet 0.5 mg Daily.   • [DISCONTINUED] losartan (COZAAR) 25 MG tablet Take 1 tablet by mouth 2 (Two) Times a Day.     MEDICATION LIST AND ALLERGIES REVIEWED.    Family History   Problem Relation Age of Onset   • Breast cancer Neg Hx    • Ovarian cancer Neg Hx      Social History     Tobacco Use   • Smoking status: Never Smoker   • Smokeless tobacco: Never Used   Substance Use Topics   • Alcohol use: No   • Drug use: No         Review of Systems   Constitutional: Positive for fatigue. Negative for chills and fever.   Eyes: Negative.    Respiratory: Negative for cough and shortness of breath.    Cardiovascular: Positive for palpitations. Negative for chest pain.   Gastrointestinal: Positive for constipation. Negative for abdominal pain, diarrhea, nausea and vomiting.   Neurological: Positive for headaches.   Psychiatric/Behavioral: Positive for confusion and sleep disturbance. The patient is nervous/anxious.      Review of systems is otherwise negative except as mentioned expressly in the HPI.    Physical Exam and Clinical Information   /89   Pulse 62   Temp 98 °F (36.7 °C) (Oral)   Resp 18   Ht 160 cm (63\")   Wt 78.5 kg (173 lb)   SpO2 100%   BMI 30.65 kg/m²      Physical Exam:    Telemetry: Rhythm: normal sinus rhythm (11/29/18 1557)     Constitutional:  No acute distress.  Conversant.   HEENT: Normocephalic and atraumatic.   Neck:  Normal range of motion. Neck supple.   No JVD present. No carotid bruits  No thyromegaly.    Cardiovascular: Regular rate and rhythm.  No murmurs, rub or gallop.     Respiratory: Normal respiratory effort.  Clear to auscultation.  No wheezes, rales, or rhonchi    Abdominal:  Soft. No masses.   Non-tender. No distension.   No hepatosplenomegaly.   MSK: Normal muscle strength and tone.   Extremities: No digital cyanosis or clubbing. no peripheral edema    Skin: Skin is " warm and dry.  No rashes, lesions or ulcers noted.    Neurological:   Alert and Oriented to person, place, and time.   Moves all extremities.     Results from last 7 days   Lab Units  11/29/18   1518   WBC 10*3/mm3  8.12   HEMOGLOBIN g/dL  11.6   PLATELETS 10*3/mm3  333     Results from last 7 days   Lab Units  11/29/18   1518   SODIUM mmol/L  114*   POTASSIUM mmol/L  4.2   CO2 mmol/L  25.0   BUN mg/dL  12   CREATININE mg/dL  0.62   MAGNESIUM mg/dL  1.7   GLUCOSE mg/dL  95     Estimated Creatinine Clearance: 69.5 mL/min (by C-G formula based on SCr of 0.62 mg/dL).          No results found for: LACTATE     IMAGES:    Ct Head Without Contrast    Result Date: 11/29/2018  No acute intracranial abnormality.  DICTATED:   11/29/2018 EDITED/ls :   11/29/2018        Xr Chest Pa & Lateral    Result Date: 11/29/2018  No acute cardiopulmonary abnormality.  D:  11/29/2018 E:  11/29/2018  This report was finalized on 11/29/2018 3:47 PM by Godwin Ross.          I reviewed the patient's results and images.       Advance Directives: There are no questions and answers to display.            Assesment     Active Hospital Problems    Diagnosis   • **Hyponatremia   • Essential hypertension   • Pre-diabetes   • Depression   • Anxiety disorder   • Hyperlipidemia         Plan/Recommendations       63 y.o.female, admitted on 11/29/2018 with Hyponatremia [E87.1]:     1. Admitted to ICU  2. I will continue the normal saline at 100 mL per hour. Serial sodium checks will occur every 4 hours and we will adjust our rate as necessary to maintain a slow increase in her serum sodium. I have also ordered serum and urine osmolality, urine sodium, as well as TSH and cortisol levels  3. Monitor for withdrawal symptoms secondary to discontinuation of Celexa and Elavil  1. Monitor for serotonin syndrome/signs and symptoms of abrupt discontinuation of SSRIs  4. Follow labs, cultures-particularly UA secondary to complaint of dysuria  1. Emperic  antimicrobials if needed  5. Restart antihypertensive medications when able.  6. A.m. lab  7. DVT prophylaxis: SCDs  8. GI prophylaxis: Famotidine    The patient is critical from hyponatremia and has potential to worsen in terms of mental status due to this though she is currently stable. She will remain in the ICU under close observation for now.    A total of 35 minutes of direct critical care time were spent with the patient.    Frank Pettit MD      CC: Hayden Curry MD    Electronically signed by Frank Pettit MD at 11/29/2018  7:50 PM       ICU Vital Signs     Row Name 12/03/18 0752 12/03/18 0600 12/03/18 0531 12/03/18 0400 12/03/18 0200       Height and Weight    Weight  --  --  -- pt. refused a daily weight right now  --  --       Vitals    Temp  99.1 °F (37.3 °C)  --  98 °F (36.7 °C)  --  --    Temp src  Axillary  --  Axillary  --  --    Pulse  113  --  113 nurse advised  --  --    Heart Rate Source  Monitor  --  Monitor  --  --    Resp  16  --  18  --  --    Resp Rate Source  Visual  --  Visual  --  --    BP  137/92  --  117/71  --  --    BP Location  Right arm  --  Right arm  --  --    BP Method  Automatic  --  Automatic  --  --    Patient Position  Lying  --  Lying  --  --       Oxygen Therapy    SpO2  97 %  --  98 %  --  --    Pulse Oximetry Type  --  --  Intermittent  --  --    Device (Oxygen Therapy)  room air  room air  room air  room air  room air    Row Name 12/03/18 0000 12/02/18 2322 12/02/18 2200 12/02/18 2039 12/02/18 2000       Vitals    Temp  --  98.4 °F (36.9 °C)  --  97.5 °F (36.4 °C)  --    Temp src  --  Oral  --  Axillary  --    Pulse  --  119 nurse advised  --  101  --    Heart Rate Source  --  Monitor  --  Monitor  --    Resp  --  16  --  16  --    Resp Rate Source  --  Visual  --  Visual  --    BP  --  137/79  --  114/74  --    BP Location  --  Right arm  --  Right arm  --    BP Method  --  Automatic  --  Automatic  --    Patient Position  --  Lying  --  Lying  --        "Oxygen Therapy    SpO2  --  95 %  --  98 %  --    Pulse Oximetry Type  --  Intermittent  --  --  --    Device (Oxygen Therapy)  room air  room air  room air  room air  room air    Row Name 12/02/18 1700 12/02/18 1600 12/02/18 1545 12/02/18 1500 12/02/18 1400       Height and Weight    Height  --  --  160 cm (62.99\")  --  --    Height Method  --  --  Stated  --  --    Ideal Body Weight (IBW) (kg)  --  --  52.7  --  --    Weight in (lb) to have BMI = 25  --  --  140.8  --  --       Vitals    Temp  --  --  97.5 °F (36.4 °C)  --  --    Temp src  --  --  Axillary  --  --    Pulse  --  --  90  98  96    Heart Rate Source  --  --  Monitor  --  Monitor    Resp  --  --  16  --  20    Resp Rate Source  --  --  Visual  --  Visual    BP  --  --  146/73  110/74  132/80    Noninvasive MAP (mmHg)  --  --  --  91  105    BP Location  --  --  Right arm  --  Right arm    BP Method  --  --  Automatic  --  Automatic    Patient Position  --  --  Lying  --  Lying       Oxygen Therapy    SpO2  --  --  99 %  96 %  100 %    Pulse Oximetry Type  --  --  Intermittent  --  Continuous    Device (Oxygen Therapy)  room air  room air  room air  --  room air    Row Name 12/02/18 1300 12/02/18 1200 12/02/18 1100             Vitals    Temp  --  97.6 °F (36.4 °C)  --      Temp src  --  Axillary  --      Pulse  --  89  98      Heart Rate Source  --  Monitor  --      Resp  --  18  --      Resp Rate Source  --  Visual  --      BP  121/80  111/76  102/78      Noninvasive MAP (mmHg)  102  99  87      BP Location  --  Right arm  --      BP Method  --  Automatic  --      Patient Position  --  Lying  --         Oxygen Therapy    SpO2  95 %  95 %  98 %      Pulse Oximetry Type  --  Continuous  --      Device (Oxygen Therapy)  --  room air  --          Hospital Medications (active)       Dose Frequency Start End    acetaminophen (TYLENOL) tablet 650 mg 650 mg Every 6 Hours PRN 11/29/2018     Sig - Route: Take 2 tablets by mouth Every 6 (Six) Hours As Needed for " "Mild Pain . - Oral    ALPRAZolam (XANAX) tablet 0.25 mg 0.25 mg 4 Times Daily PRN 12/1/2018 12/11/2018    Sig - Route: Take 1 tablet by mouth 4 (Four) Times a Day As Needed for Anxiety. - Oral    aspirin EC tablet 81 mg 81 mg Daily 11/30/2018     Sig - Route: Take 1 tablet by mouth Daily. - Oral    atorvastatin (LIPITOR) tablet 10 mg 10 mg Nightly 11/30/2018     Sig - Route: Take 1 tablet by mouth Every Night. - Oral    bisacodyl (DULCOLAX) EC tablet 10 mg 10 mg Daily PRN 12/2/2018     Sig - Route: Take 2 tablets by mouth Daily As Needed for Constipation. - Oral    docusate sodium (COLACE) capsule 100 mg 100 mg 2 Times Daily 12/3/2018     Sig - Route: Take 1 capsule by mouth 2 (Two) Times a Day. - Oral    ipratropium-albuterol (DUO-NEB) nebulizer solution 3 mL 3 mL Every 6 Hours PRN 11/29/2018     Sig - Route: Take 3 mL by nebulization Every 6 (Six) Hours As Needed for Wheezing or Shortness of Air. - Nebulization    magnesium sulfate 4g/100mL (PREMIX) 4 g Once 12/2/2018 12/2/2018    Sig - Route: Infuse 100 mL into a venous catheter 1 (One) Time. - Intravenous    metoprolol tartrate (LOPRESSOR) tablet 25 mg 25 mg 2 Times Daily 12/2/2018     Sig - Route: Take 1 tablet by mouth 2 (Two) Times a Day. - Oral    pantoprazole (PROTONIX) EC tablet 40 mg 40 mg Every Early Morning 11/30/2018     Sig - Route: Take 1 tablet by mouth Every Morning. - Oral    polyethylene glycol 3350 powder (packet) 17 g Daily PRN 12/3/2018     Sig - Route: Take 17 g by mouth Daily As Needed for Constipation. - Oral    polyvinyl alcohol (LIQUIFILM) 1.4 % ophthalmic solution 1 drop 1 drop Every 2 Hours PRN 11/30/2018     Sig - Route: Administer 1 drop to both eyes Every 2 (Two) Hours As Needed for Dry Eyes. - Both Eyes    sodium chloride 0.9 % flush 10 mL 10 mL As Needed 11/29/2018     Sig - Route: Infuse 10 mL into a venous catheter As Needed for Line Care. - Intravenous    Linked Group 1:  \"And\" Linked Group Details        sodium chloride tablet " 1 g 1 g 3 Times Daily With Meals 2018     Sig - Route: Take 1 tablet by mouth 3 (Three) Times a Day With Meals. - Oral    zolpidem (AMBIEN) tablet 5 mg 5 mg Nightly PRN 2018    Sig - Route: Take 1 tablet by mouth At Night As Needed for Sleep. - Oral    sodium chloride 0.9 % flush 10 mL (Discontinued) 10 mL As Needed 2018    Sig - Route: Infuse 10 mL into a venous catheter As Needed for Line Care. - Intravenous    Cosign for Ordering: Required by Zaire Guerin MD    sodium chloride 0.9 % flush 3 mL (Discontinued) 3 mL Every 12 Hours Scheduled 2018    Sig - Route: Infuse 3 mL into a venous catheter Every 12 (Twelve) Hours. - Intravenous    sodium chloride 0.9 % flush 3-10 mL (Discontinued) 3-10 mL As Needed 2018    Sig - Route: Infuse 3-10 mL into a venous catheter As Needed for Line Care. - Intravenous          Operative/Procedure Notes (all)     No notes of this type exist for this encounter.           Physician Progress Notes (all)      Michael Stapleton MD at 12/3/2018  9:12 AM              [facility] Medicine Services  PROGRESS NOTE    Patient Name: Toby Hoffman  : 1955  MRN: [id number]    Date of Admission: 2018  Length of Stay: 4  Primary Care Physician: Hayden Curry MD    Subjective   Subjective     CC:  Low sodium    HPI:  Hospital medicine assuming care today from ICU. Pt is sitting up in chair, eating breakfast with daughter-in-law at bedside during my visit. She denies headache, vision changes, abdominal pain, nausea, or vomiting. Continues to note anxiety and insomnia while in hospital.     Review of Systems  Gen- No fevers, chills  CV- No chest pain, palpitations  Resp- No cough, dyspnea  GI- No N/V/D, abd pain    Otherwise ROS is negative except as mentioned in the HPI.    Objective   Objective     Vital Signs:   Temp:  [97.5 °F (36.4 °C)-99.1 °F (37.3 °C)] 99.1 °F (37.3 °C)  Heart Rate:   [] 113  Resp:  [16-20] 16  BP: (102-146)/(71-92) 137/92     Physical Exam:  Constitutional: No acute distress, awake, alert  HENT: NCAT, mucous membranes moist  Respiratory: Clear to auscultation bilaterally, respiratory effort normal   Cardiovascular: RRR, no murmurs, rubs, or gallops, palpable pedal pulses bilaterally  Gastrointestinal: Positive bowel sounds, soft, nontender, nondistended, obese  Musculoskeletal: No bilateral ankle edema  Psychiatric: Appropriate affect, cooperative  Neurologic: Oriented x 3, strength symmetric in all extremities, Cranial Nerves grossly intact to confrontation, speech clear  Skin: No rashes      Results Reviewed:  I have personally reviewed current lab, radiology, and data and agree.    Results from last 7 days   Lab Units  12/03/18   0532  12/02/18   0551  11/30/18   0056   WBC 10*3/mm3  7.19  6.25  7.01   HEMOGLOBIN g/dL  11.1*  11.3*  11.5   HEMATOCRIT %  33.4*  34.1*  33.4*   PLATELETS 10*3/mm3  346  325  321     Results from last 7 days   Lab Units  12/03/18   0532  12/02/18   2344  12/02/18   1803   12/02/18   0551   11/30/18   0056   11/29/18   1518   SODIUM mmol/L  129*  129*  128*   < >  128*   < >  122*   < >  114*   POTASSIUM mmol/L  4.4   --    --    --   4.7   --   4.1   --   4.2   CHLORIDE mmol/L  101   --    --    --   99   --   92*   --   84*   CO2 mmol/L  22.0   --    --    --   23.0   --   26.0   --   25.0   BUN mg/dL  11   --    --    --   9   --   8*   --   12   CREATININE mg/dL  0.70   --    --    --   0.65   --   0.57*   --   0.62   GLUCOSE mg/dL  98   --    --    --   101*   --   93   --   95   CALCIUM mg/dL  8.5*   --    --    --   8.9   --   8.5*   --   9.0   ALT (SGPT) U/L   --    --    --    --    --    --   13   --   12   AST (SGOT) U/L   --    --    --    --    --    --   14   --   14    < > = values in this interval not displayed.     Estimated Creatinine Clearance: 69.5 mL/min (by C-G formula based on SCr of 0.7 mg/dL).  No results found for:  BNP    Microbiology Results Abnormal     None          Imaging Results (last 24 hours)     ** No results found for the last 24 hours. **             I have reviewed the medications.      aspirin 81 mg Oral Daily   atorvastatin 10 mg Oral Nightly   metoprolol tartrate 25 mg Oral BID   pantoprazole 40 mg Oral Q AM   sodium chloride 1 g Oral TID With Meals         Assessment/Plan   Assessment / Plan     Active Hospital Problems    Diagnosis   • **Hyponatremia   • Essential hypertension   • Pre-diabetes   • Depression   • Anxiety disorder   • Hyperlipidemia     Brief Hospital Course to date:  Toby Hoffman is a 63 y.o. female with a past medical history of hypertension, hyperlipidemia, obesity, anxiety/depression, and prediabetes who was admitted to the ICU on 11/29 with severe, symptomatic hyponatremia with initial serum sodium of 114 associated with symptoms of lethargy, fatigue, and sleep difficulties.  Risk factor for hyponatremia was use of Celexa and Elavil.    Symptomatic, severe hyponatremia due to SIADH from SSRI / TCA  - Initial Na 114, now 129 and stable, monitor q8hrs  -Fluid restriction 1.5 L, salt tabs  - Monitor until tomorrow and then dc as long as sodium is stable or improved. Pt did not feel comfortable being discharged today     Chronic Anxiety  - prn xanax and ambien  - Needs f/u with PCP on discharge for trial of alternate medication, ? buspirone    Constipation  - add scheduled docusate, prn miralax     Hypertension  - BP reviewed and stable on metoprolol    Hyperlipidemia, continue statin  Pre-DM, FSBS reviewed, has been stable without need for insulin, will not continue accuchecks  Obesity    DVT Prophylaxis:  mechanical  CODE STATUS:   Code Status and Medical Interventions:   Ordered at: 11/29/18 8247     Code Status:    CPR     Medical Interventions (Level of Support Prior to Arrest):    Full       Disposition: I expect the patient to be discharged home in 1 days.      Electronically signed by  "Michael Stapleton MD, 12/03/18, 9:12 AM.        Electronically signed by Michael Stapleton MD at 12/3/2018  9:17 AM     Edvin Fernandes MD at 12/2/2018  4:57 PM          INTENSIVIST NOTE     Hospital Day: 3      Ms. Toby Hoffman, 63 y.o. female is followed for:   Hyponatremia        SUBJECTIVE     Interval history:    Sodium level now 128.  Mental status intact.  Afebrile.  Complaining of lack of sleep and lack of bowel movement    The patient's relevant past medical, surgical and social history were reviewed and updated in Epic as appropriate.       OBJECTIVE     Vital Sign Min/Max for last 24 hours  Temp  Min: 97.5 °F (36.4 °C)  Max: 98.4 °F (36.9 °C)   BP  Min: 82/55  Max: 146/73   Pulse  Min: 83  Max: 106   Resp  Min: 16  Max: 20   SpO2  Min: 93 %  Max: 100 %   No Data Recorded   No Data Recorded      Intake/Output Summary (Last 24 hours) at 12/2/2018 1658  Last data filed at 12/2/2018 1400  Gross per 24 hour   Intake 1315 ml   Output 400 ml   Net 915 ml      Flowsheet Rows      First Filed Value   Admission Height  160 cm (63\") Documented at 11/29/2018 1420   Admission Weight  78.5 kg (173 lb) Documented at 11/29/2018 1420             11/29/18  1420   Weight: 78.5 kg (173 lb)            Objective:  General Appearance:  In no acute distress.    Vital signs: (most recent): Blood pressure 146/73, pulse 90, temperature 97.5 °F (36.4 °C), temperature source Axillary, resp. rate 16, height 160 cm (62.99\"), weight 78.5 kg (173 lb), SpO2 99 %.    HEENT: Normal HEENT exam.    Lungs:  Normal effort and normal respiratory rate.  Breath sounds clear to auscultation.  She is not in respiratory distress.  No rales, wheezes or rhonchi.    Heart: Normal rate.  Regular rhythm.  S1 normal and S2 normal.  No murmur, gallop or friction rub.   Chest: Symmetric chest wall expansion.   Abdomen: Abdomen is soft and non-distended.  Bowel sounds are normal.   There is no abdominal tenderness.   There is no mass. There is no " splenomegaly. There is no hepatomegaly.   Extremities: There is no deformity or dependent edema.    Neurological: Patient is alert and oriented to person, place and time.    Pupils:  Pupils are equal, round, and reactive to light.    Skin:  Warm and dry.              I reviewed the patient's new clinical results.  I reviewed the patient's new imaging results/reports including actual images and agree with reports.      Chest X-Ray:  NAD    INFUSIONS       Results from last 7 days   Lab Units  12/02/18   0551  11/30/18   0056  11/29/18   1518   WBC 10*3/mm3  6.25  7.01  8.12   HEMOGLOBIN g/dL  11.3*  11.5  11.6   HEMATOCRIT %  34.1*  33.4*  34.0*   PLATELETS 10*3/mm3  325  321  333     Results from last 7 days   Lab Units  12/02/18   1221  12/02/18   0551  12/01/18   2359   11/30/18   0056   11/29/18   1518   SODIUM mmol/L  128*  128*  126*   < >  122*   < >  114*   POTASSIUM mmol/L   --   4.7   --    --   4.1   --   4.2   CHLORIDE mmol/L   --   99   --    --   92*   --   84*   CO2 mmol/L   --   23.0   --    --   26.0   --   25.0   BUN mg/dL   --   9   --    --   8*   --   12   GLUCOSE mg/dL   --   101*   --    --   93   --   95   CREATININE mg/dL   --   0.65   --    --   0.57*   --   0.62   MAGNESIUM mg/dL   --   1.8   --    --   1.8   --   1.7   CALCIUM mg/dL   --   8.9   --    --   8.5*   --   9.0    < > = values in this interval not displayed.                 [facility] Ventilation:      I reviewed the patient's medications.    Assessment/Plan   ASSESSMENT      Active Hospital Problems    Diagnosis   • **Hyponatremia   • Essential hypertension   • Pre-diabetes   • Depression   • Anxiety disorder   • Hyperlipidemia         63-year-old female with history of hypertension, prediabetes, dyslipidemia, depression, and anxiety on SSRI and TCA admitted on 11/29 with symptomatic hyponatremia.  SSRI implicated         PLAN     1. Fluid restriction   2. Salt tablets   3. Serial sodium levels to avoid excessive increase   4. Okay  "to telemetry now that sodium at a more reasonable level   5. Would assume alternative to SSRI should be considered as an outpatient          I discussed the patient's findings and my recommendations with patient, family and nursing staff     Plan of care and goals reviewed with multidisciplinary team at daily rounds.    Edvin Fernandes MD  Pulmonary and Critical Care Medicine  12/02/18 4:58 PM         Electronically signed by Edvin Fernandes MD at 12/2/2018  4:59 PM     Edvin Fernandes MD at 12/1/2018  4:58 PM          INTENSIVIST NOTE     Hospital Day: 2      Ms. Toby Hoffman, 63 y.o. female is followed for:   Hyponatremia        SUBJECTIVE     Interval history:    Sodium remains around 120.  She remains awake and alert.  Afebrile.  Fluid balance +900 mL.    The patient's relevant past medical, surgical and social history were reviewed and updated in Epic as appropriate.       OBJECTIVE     Vital Sign Min/Max for last 24 hours  Temp  Min: 97.7 °F (36.5 °C)  Max: 98 °F (36.7 °C)   BP  Min: 91/64  Max: 138/76   Pulse  Min: 67  Max: 104   Resp  Min: 14  Max: 18   SpO2  Min: 94 %  Max: 100 %   No Data Recorded   No Data Recorded      Intake/Output Summary (Last 24 hours) at 12/1/2018 1658  Last data filed at 12/1/2018 1500  Gross per 24 hour   Intake 1173 ml   Output 275 ml   Net 898 ml      Flowsheet Rows      First Filed Value   Admission Height  160 cm (63\") Documented at 11/29/2018 1420   Admission Weight  78.5 kg (173 lb) Documented at 11/29/2018 1420             11/29/18  1420   Weight: 78.5 kg (173 lb)            Objective:  General Appearance:  In no acute distress.    Vital signs: (most recent): Blood pressure 123/79, pulse 94, temperature 98 °F (36.7 °C), temperature source Oral, resp. rate 18, height 160 cm (63\"), weight 78.5 kg (173 lb), SpO2 95 %.    HEENT: Normal HEENT exam.    Lungs:  Normal effort and normal respiratory rate.  Breath sounds clear to auscultation.  She is not " in respiratory distress.  No rales, wheezes or rhonchi.    Heart: Normal rate.  Regular rhythm.  S1 normal and S2 normal.  No murmur, gallop or friction rub.   Chest: Symmetric chest wall expansion.   Abdomen: Abdomen is soft and non-distended.  Bowel sounds are normal.   There is no abdominal tenderness.   There is no mass. There is no splenomegaly. There is no hepatomegaly.   Extremities: There is no deformity or dependent edema.    Neurological: Patient is alert and oriented to person, place and time.    Pupils:  Pupils are equal, round, and reactive to light.    Skin:  Warm and dry.              I reviewed the patient's new clinical results.  I reviewed the patient's new imaging results/reports including actual images and agree with reports.      Chest X-Ray:  NAD    INFUSIONS       Results from last 7 days   Lab Units  11/30/18   0056  11/29/18   1518   WBC 10*3/mm3  7.01  8.12   HEMOGLOBIN g/dL  11.5  11.6   HEMATOCRIT %  33.4*  34.0*   PLATELETS 10*3/mm3  321  333     Results from last 7 days   Lab Units  12/01/18   1140  12/01/18   0747  12/01/18   0348   11/30/18   0056   11/29/18   1518   SODIUM mmol/L  120*  122*  122*   < >  122*   < >  114*   POTASSIUM mmol/L   --    --    --    --   4.1   --   4.2   CHLORIDE mmol/L   --    --    --    --   92*   --   84*   CO2 mmol/L   --    --    --    --   26.0   --   25.0   BUN mg/dL   --    --    --    --   8*   --   12   GLUCOSE mg/dL   --    --    --    --   93   --   95   CREATININE mg/dL   --    --    --    --   0.57*   --   0.62   MAGNESIUM mg/dL   --    --    --    --   1.8   --   1.7   CALCIUM mg/dL   --    --    --    --   8.5*   --   9.0    < > = values in this interval not displayed.                 [facility]:      I reviewed the patient's medications.    Assessment/Plan   ASSESSMENT      Active Hospital Problems    Diagnosis   • **Hyponatremia   • Essential hypertension   • Pre-diabetes   • Depression   • Anxiety disorder   • Hyperlipidemia          63-year-old female with history of hypertension, prediabetes, dyslipidemia, depression, and anxiety on SSRI and TCA admitted on 11/29 with symptomatic hyponatremia.  SSRI implicated         PLAN     6. Fluid restriction   7. Salt tablets   8. Serial sodium levels to avoid excessive increase   9. Monitor in ICU until sodium level appropriately improved          I discussed the patient's findings and my recommendations with patient, family and nursing staff     Plan of care and goals reviewed with multidisciplinary team at daily rounds.    Edvin Fernandes MD  Pulmonary and Critical Care Medicine  12/01/18 4:58 PM         Electronically signed by Edvin Fernandes MD at 12/1/2018  5:01 PM     Lucas Farah MD at 11/30/2018 10:59 AM          INTENSIVIST / PULMONARY FOLLOW UP NOTE     Hospital:  LOS: 1 day   Ms. Toby Hoffman, 63 y.o. female is followed for:     Hyponatremia    Essential hypertension    Hyperlipidemia    Pre-diabetes    Anxiety disorder    Depression       Subjective   SUBJECTIVE   Resting comfortably, no complaints    The patient's relevant past medical, surgical, family, and social history were reviewed    Allergies and medications were reviewed    ROS:  Per subjective, all other systems were reviewed and were negative     Objective   OBJECTIVE     Vital Sign Min/Max for last 24 hours:  Temp  Min: 98 °F (36.7 °C)  Max: 98.4 °F (36.9 °C)   BP  Min: 87/55  Max: 159/93   Pulse  Min: 57  Max: 71   Resp  Min: 16  Max: 18   SpO2  Min: 94 %  Max: 100 %   No Data Recorded     Physical Exam:  General Appearance:  Conversant, in no acute distress  Eyes:  No scleral icterus or pallor, pupils normal  Ears, Nose, Mouth, Throat:  Atraumatic, oropharynx clear  Neck:  Trachea midline, thyroid normal  Respiratory:  Clear to auscultation bilaterally, normal effort, no tenderness to palpation  Cardiovascular:  Regular rate and rhythm, no murmurs, no peripheral edema, no  thrill  Gastrointestinal:  Soft, non-tender, non-distended, no hepatosplenomegaly  Skin:  Normal temperature, no rash  Psychiatric:  Alert and oriented x 3, normal judgement and insight  Neuro:  No new focal neurologic deficits observed    Telemetry:              Hemodynamics:   CVP:     PAP:     PAOP:     CO:     CI:     SVI:     SVR:       SpO2: 98 % SpO2  Min: 94 %  Max: 100 %   Device:      Flow Rate:   No Data Recorded     Mechanical Ventilator Settings:                                         Intake/Ouptut 24 hrs (7:00AM - 6:59 AM)  Intake & Output (last 3 days)       11/27 0701 - 11/28 0700 11/28 0701 - 11/29 0700 11/29 0701 - 11/30 0700 11/30 0701 - 12/01 0700    P.O.   0     I.V. (mL/kg)   673 (8.6)     Total Intake(mL/kg)   673 (8.6)     Urine (mL/kg/hr)   900     Total Output   900     Net   -227             Urine Unmeasured Occurrence   2 x           Lines, Drains & Airways    Active LDAs     Name:   Placement date:   Placement time:   Site:   Days:    Peripheral IV 11/29/18 1746 Left Antecubital   11/29/18 1746    Antecubital   less than 1                Hematology:  Results from last 7 days   Lab Units  11/30/18 0056 11/29/18   1518   WBC 10*3/mm3  7.01  8.12   HEMOGLOBIN g/dL  11.5  11.6   HEMATOCRIT %  33.4*  34.0*   PLATELETS 10*3/mm3  321  333     Electrolytes, Magnesium and Phosphorus:  Results from last 7 days   Lab Units  11/30/18   0920  11/30/18   0514  11/30/18 0056 11/29/18   1942  11/29/18   1518   SODIUM mmol/L  122*  122*  122*  121*  114*   CHLORIDE mmol/L   --    --   92*   --   84*   POTASSIUM mmol/L   --    --   4.1   --   4.2   CO2 mmol/L   --    --   26.0   --   25.0   MAGNESIUM mg/dL   --    --   1.8   --   1.7   PHOSPHORUS mg/dL   --    --   3.2   --    --      Renal:  Results from last 7 days   Lab Units  11/30/18   0056 11/29/18   1518   CREATININE mg/dL  0.57*  0.62   BUN mg/dL  8*  12     Estimated Creatinine Clearance: 69.5 mL/min (A) (by C-G formula based on SCr of  "0.57 mg/dL (L)).  Hepatic:  Results from last 7 days   Lab Units  11/30/18   0056  11/29/18   1518   ALK PHOS U/L  102*  112*   BILIRUBIN mg/dL  0.6  0.4   ALT (SGPT) U/L  13  12   AST (SGOT) U/L  14  14     Arterial Blood Gases:              No results found for: LACTATE    Relevant imaging studies and labs from 11/30/18 were reviewed and interpreted by me    Medications (drips):         famotidine 20 mg Intravenous Q12H   sodium chloride 3 mL Intravenous Q12H   sodium chloride 1 g Oral TID With Meals       Assessment/Plan   IMPRESSION / PLAN     Inpatient Problem List:  63 y.o.female:  Active Hospital Problems    Diagnosis   • **Hyponatremia   • Essential hypertension   • Pre-diabetes   • Depression   • Anxiety disorder   • Hyperlipidemia        Impression:  63 y.o.female with relevant PMH of HTN, Pre-DM, HLD, Depression, Anxiety on SSRI and TCA admitted 11/29/2018 w/ symptomatic hyponatremia    Plan:  SIADH - d/c saline, 1.5 liter fluid restriction, salt tablets, aim to increase no more than ~18 meq in 48 hours.  Starting sodium was 114.  Hold SSRI and TCA.  May have to find alternatives as outpatient.  Check sodium q4.  Give IV D5W bolus if we \"overcorrect\" (more than 10 meq in 24 hrs or 18 in 48 hrs).    Supportive care    Nutrition - Diet Regular; Kosher / Halal, Daily Fluid Restriction; 1500 mL Fluid    Plan of care and goals reviewed with mulitdisciplinary team at daily rounds    Critical Care time spent in direct patient care: 30 minutes (excluding procedure time, if applicable) including high complexity decision making to assess, manipulate, and support vital organ system failure in this individual who has impairment of one or more vital organ systems such that there is a high probability of imminent or life threatening deterioration in the patient’s condition.       Lucas Farah MD  Intensive Care Medicine  11/30/18 11:01 AM         Electronically signed by Lucas Farah MD at " 11/30/2018 11:05 AM       Consult Notes (all)     No notes of this type exist for this encounter.

## 2018-12-03 NOTE — PLAN OF CARE
Problem: Patient Care Overview  Goal: Plan of Care Review  Outcome: Ongoing (interventions implemented as appropriate)   12/03/18 5334   Plan of Care Review   Progress improving   OTHER   Outcome Summary Pt alert and oriented. Family at bedside to translate. no reports of anxiety. Na+ 129 and stable, will continue Q6 checks. 1500fluid restriction followed. Pt slept well. will continue to monitor.    Coping/Psychosocial   Plan of Care Reviewed With patient     Goal: Individualization and Mutuality  Outcome: Ongoing (interventions implemented as appropriate)    Goal: Discharge Needs Assessment  Outcome: Ongoing (interventions implemented as appropriate)    Goal: Interprofessional Rounds/Family Conf  Outcome: Ongoing (interventions implemented as appropriate)

## 2018-12-03 NOTE — PROGRESS NOTES
Continued Stay Note  [facility]     Patient Name: Toby Hoffman  MRN: [id number]  Today's Date: 12/3/2018    Admit Date: 11/29/2018    Discharge Plan     Row Name 12/03/18 1151       Plan    Plan  Home with family    Patient/Family in Agreement with Plan  yes    Plan Comments  Patient anticipates home with family at discharge. No current discharge planning needs identified. Will continue to follow.     Final Discharge Disposition Code  01 - home or self-care        Discharge Codes    No documentation.       Expected Discharge Date and Time     Expected Discharge Date Expected Discharge Time    Dec 3, 2018             Marce Ellison RN

## 2018-12-03 NOTE — PROGRESS NOTES
[facility]  PROGRESS NOTE    Patient Name: Toby Hoffman  : 1955  MRN: [id number]    Date of Admission: 2018  Length of Stay: 4  Primary Care Physician: Hayden Curry MD    Subjective   Subjective     CC:  Low sodium    HPI:  Hospital medicine assuming care today from ICU. Pt is sitting up in chair, eating breakfast with daughter-in-law at bedside during my visit. She denies headache, vision changes, abdominal pain, nausea, or vomiting. Continues to note anxiety and insomnia while in hospital.     Review of Systems  Gen- No fevers, chills  CV- No chest pain, palpitations  Resp- No cough, dyspnea  GI- No N/V/D, abd pain    Otherwise ROS is negative except as mentioned in the HPI.    Objective   Objective     Vital Signs:   Temp:  [97.5 °F (36.4 °C)-99.1 °F (37.3 °C)] 99.1 °F (37.3 °C)  Heart Rate:  [] 113  Resp:  [16-20] 16  BP: (102-146)/(71-92) 137/92     Physical Exam:  Constitutional: No acute distress, awake, alert  HENT: NCAT, mucous membranes moist  Respiratory: Clear to auscultation bilaterally, respiratory effort normal   Cardiovascular: RRR, no murmurs, rubs, or gallops, palpable pedal pulses bilaterally  Gastrointestinal: Positive bowel sounds, soft, nontender, nondistended, obese  Musculoskeletal: No bilateral ankle edema  Psychiatric: Appropriate affect, cooperative  Neurologic: Oriented x 3, strength symmetric in all extremities, Cranial Nerves grossly intact to confrontation, speech clear  Skin: No rashes      Results Reviewed:  I have personally reviewed current lab, radiology, and data and agree.    Results from last 7 days   Lab Units  18   0532  18   0551  18   0056   WBC 10*3/mm3  7.19  6.25  7.01   HEMOGLOBIN g/dL  11.1*  11.3*  11.5   HEMATOCRIT %  33.4*  34.1*  33.4*   PLATELETS 10*3/mm3  346  325  321     Results from last 7 days   Lab Units  18   0532  18   2344  18   1803   18   0551    11/30/18   0056   11/29/18   1518   SODIUM mmol/L  129*  129*  128*   < >  128*   < >  122*   < >  114*   POTASSIUM mmol/L  4.4   --    --    --   4.7   --   4.1   --   4.2   CHLORIDE mmol/L  101   --    --    --   99   --   92*   --   84*   CO2 mmol/L  22.0   --    --    --   23.0   --   26.0   --   25.0   BUN mg/dL  11   --    --    --   9   --   8*   --   12   CREATININE mg/dL  0.70   --    --    --   0.65   --   0.57*   --   0.62   GLUCOSE mg/dL  98   --    --    --   101*   --   93   --   95   CALCIUM mg/dL  8.5*   --    --    --   8.9   --   8.5*   --   9.0   ALT (SGPT) U/L   --    --    --    --    --    --   13   --   12   AST (SGOT) U/L   --    --    --    --    --    --   14   --   14    < > = values in this interval not displayed.     Estimated Creatinine Clearance: 69.5 mL/min (by C-G formula based on SCr of 0.7 mg/dL).  No results found for: BNP    Microbiology Results Abnormal     None          Imaging Results (last 24 hours)     ** No results found for the last 24 hours. **             I have reviewed the medications.      aspirin 81 mg Oral Daily   atorvastatin 10 mg Oral Nightly   metoprolol tartrate 25 mg Oral BID   pantoprazole 40 mg Oral Q AM   sodium chloride 1 g Oral TID With Meals         Assessment/Plan   Assessment / Plan     Active Hospital Problems    Diagnosis   • **Hyponatremia   • Essential hypertension   • Pre-diabetes   • Depression   • Anxiety disorder   • Hyperlipidemia     Brief Hospital Course to date:  Toby Hoffman is a 63 y.o. female with a past medical history of hypertension, hyperlipidemia, obesity, anxiety/depression, and prediabetes who was admitted to the ICU on 11/29 with severe, symptomatic hyponatremia with initial serum sodium of 114 associated with symptoms of lethargy, fatigue, and sleep difficulties.  Risk factor for hyponatremia was use of Celexa and Elavil.    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 Pt did not feel comfortable being discharged today     Chronic Anxiety  - prn xanax and ambien  - Needs f/u with PCP on discharge for trial of alternate medication, ? buspirone    Constipation  - add scheduled docusate, prn miralax     Hypertension  - BP reviewed and stable on metoprolol    Hyperlipidemia, continue statin  Pre-DM, FSBS reviewed, has been stable without need for insulin, will not continue accuchecks  Obesity    DVT Prophylaxis:  mechanical  CODE STATUS:   Code Status and Medical Interventions:   Ordered at: 11/29/18 1846     Code Status:    CPR     Medical Interventions (Level of Support Prior to Arrest):    Full       Disposition: I expect the patient to be discharged home in 1 days.      Electronically signed by Michael Stapleton MD, 12/03/18, 9:12 AM.

## 2018-12-03 NOTE — PROGRESS NOTES
Clinical Nutrition   Reason For Visit: Follow-up protocol    Patient Name: Toby Hoffman  YOB: 1955  MRN: [id number]  Date of Encounter: 12/03/18 4:13 PM  Admission date: 11/29/2018      Nutrition Assessment     Admission Problem List:  Hyponatremia      PMH: She  has a past medical history of Anxiety, Hyperlipidemia, and Hypertension.   PSxH: She  has no past surgical history on file.        Reported/Observed/Food/Nutrition Related History   Pt f/u per protocol. Spoke with Pt and her daughter in law who acted as a . Pt reports she is having good intake and has a good appetite. Reports no episodes of nausea or vomiting. Pt reports constipation. Pt's main concern was her food preferences d/t religous preferences. Would like to receive scrambled eggs with vegetables and wheat bread at breakfast.        Anthropometrics   Height: 63 in  Weight: 173 lb per stated wt (11/29)  BMI: 30.7  BMI classification: Obese Class I: 30-34.9kg/m2     Weight change: Family reports that pt does not have any recent wt loss or wt changes.       Labs reviewed   Labs reviewed: Yes  Results from last 7 days   Lab Units  12/03/18   0532  12/02/18   2344  12/02/18   1803   12/02/18   0551   11/30/18   0056   SODIUM mmol/L  129*  129*  128*   < >  128*   < >  122*   POTASSIUM mmol/L  4.4   --    --    --   4.7   --   4.1   CHLORIDE mmol/L  101   --    --    --   99   --   92*   CO2 mmol/L  22.0   --    --    --   23.0   --   26.0   BUN mg/dL  11   --    --    --   9   --   8*   CREATININE mg/dL  0.70   --    --    --   0.65   --   0.57*   GLUCOSE mg/dL  98   --    --    --   101*   --   93   CALCIUM mg/dL  8.5*   --    --    --   8.9   --   8.5*   PHOSPHORUS mg/dL   --    --    --    --   4.5   --   3.2   MAGNESIUM mg/dL  2.3   --    --    --   1.8   --   1.8    < > = values in this interval not displayed.     Medications reviewed   Medications reviewed: Yes  Pertinent: salt tablets    Current Nutrition Prescription    PO: Regular, 1500 ml/day fluid restriction    Evaluation of Received Nutrient/Fluid Intake:  Insufficient data       Nutrition Diagnosis   11/30  Problem Limited Food acceptance   Etiology Cultural preferences   Signs/Symptoms Pt on regular diet however follows a halal/kosher diet   Ongoing (revised 12/3).     Intervention   Intervention: Follow treatment progress, Care plan reviewed, Interview for preferences, Menu adjusted, Encourage intake     -Will continue 1500 ml/day fluid restriction per intensivist      Goal:   General: Maintain nutrition  PO: Maintain intake, Meet estimated needs      Monitoring/Evaluation:       Monitoring/Evaluation: Per protocol, PO intake, Pertinent labs, Symptoms     Will Continue to follow per protocol  Ching Barriga RD  Time Spent: 20 minutes

## 2018-12-04 ENCOUNTER — NURSE TRIAGE (OUTPATIENT)
Dept: CALL CENTER | Facility: HOSPITAL | Age: 63
End: 2018-12-04

## 2018-12-04 VITALS
TEMPERATURE: 97.9 F | HEIGHT: 63 IN | OXYGEN SATURATION: 96 % | HEART RATE: 71 BPM | DIASTOLIC BLOOD PRESSURE: 72 MMHG | SYSTOLIC BLOOD PRESSURE: 112 MMHG | WEIGHT: 168.38 LBS | BODY MASS INDEX: 29.84 KG/M2 | RESPIRATION RATE: 18 BRPM

## 2018-12-04 LAB
SODIUM BLD-SCNC: 130 MMOL/L (ref 132–146)
SODIUM BLD-SCNC: 130 MMOL/L (ref 132–146)

## 2018-12-04 PROCEDURE — 99239 HOSP IP/OBS DSCHRG MGMT >30: CPT | Performed by: HOSPITALIST

## 2018-12-04 PROCEDURE — 84295 ASSAY OF SERUM SODIUM: CPT | Performed by: HOSPITALIST

## 2018-12-04 RX ORDER — ALPRAZOLAM 0.25 MG/1
0.25 TABLET ORAL DAILY PRN
Qty: 7 TABLET | Refills: 0 | Status: SHIPPED | OUTPATIENT
Start: 2018-12-04 | End: 2018-12-11

## 2018-12-04 RX ADMIN — DOCUSATE SODIUM 100 MG: 100 CAPSULE, LIQUID FILLED ORAL at 08:28

## 2018-12-04 RX ADMIN — PANTOPRAZOLE SODIUM 40 MG: 40 TABLET, DELAYED RELEASE ORAL at 05:48

## 2018-12-04 RX ADMIN — ALPRAZOLAM 0.25 MG: 0.25 TABLET ORAL at 10:37

## 2018-12-04 RX ADMIN — METOPROLOL TARTRATE 25 MG: 25 TABLET ORAL at 08:28

## 2018-12-04 RX ADMIN — Medication 1 G: at 13:02

## 2018-12-04 RX ADMIN — ASPIRIN 81 MG: 81 TABLET, COATED ORAL at 08:28

## 2018-12-04 RX ADMIN — ACETAMINOPHEN 650 MG: 325 TABLET ORAL at 10:37

## 2018-12-04 RX ADMIN — Medication 1 G: at 08:28

## 2018-12-04 NOTE — TELEPHONE ENCOUNTER
"Caller is very difficult to understand. I asked if she received her one new medication of xanax? States yes. Is needing refills on some of her home meds. Will contact her PCP office for that. Call me back if have trouble reaching anyone and will send to case management.   16:57:Called patient back to make sure she has gotten in touch with doctor and has her medication, she states yes, thank you for follow up.     Reason for Disposition  • [1] Prescription not at pharmacy AND [2] was prescribed today by PCP    Additional Information  • Negative: Drug overdose and nurse unable to answer question  • Negative: Caller requesting information not related to medicine  • Negative: Caller requesting a prescription for Strep throat and has a positive culture result  • Negative: Rash while taking a medication or within 3 days of stopping it  • Negative: Immunization reaction suspected  • Negative: [1] Asthma and [2] having symptoms of asthma (cough, wheezing, etc)  • Negative: MORE THAN A DOUBLE DOSE of a prescription or over-the-counter (OTC) drug  • Negative: [1] DOUBLE DOSE (an extra dose or lesser amount) of over-the-counter (OTC) drug AND [2] any symptoms (e.g., dizziness, nausea, pain, sleepiness)  • Negative: [1] DOUBLE DOSE (an extra dose or lesser amount) of prescription drug AND [2] any symptoms (e.g., dizziness, nausea, pain, sleepiness)  • Negative: Took another person's prescription drug  • Negative: [1] DOUBLE DOSE (an extra dose or lesser amount) of prescription drug AND [2] NO symptoms (Exception: a double dose of antibiotics)  • Negative: Diabetes drug error or overdose (e.g., insulin or extra dose)  • Negative: [1] Request for URGENT new prescription or refill of \"essential\" medication (i.e., likelihood of harm to patient if not taken) AND [2] triager unable to fill per unit policy    Answer Assessment - Initial Assessment Questions  1. SYMPTOMS: \"Do you have any symptoms?\"      NA  2. SEVERITY: If symptoms " "are present, ask \"Are they mild, moderate or severe?\"      NA    Protocols used: MEDICATION QUESTION CALL-ADULT-      "

## 2018-12-04 NOTE — DISCHARGE SUMMARY
[facility] Medicine Services  DISCHARGE SUMMARY    Patient Name: Toby Hoffman  : 1955  MRN: [id number]    Date of Admission: 2018  Date of Discharge:  18    Primary Care Physician: Hayden Curry MD    Consults     No orders found from 10/31/2018 to 2018.        Hospital Course     Presenting Problem:   Hyponatremia [E87.1]    Active Hospital Problems    Diagnosis Date Noted   • **Hyponatremia [E87.1] 2018   • Essential hypertension [I10] 2018   • Pre-diabetes [R73.03] 2018   • Depression [F32.9] 2018   • Anxiety disorder [F41.9] 2018   • Hyperlipidemia [E78.5] 2018      Resolved Hospital Problems   No resolved problems to display.      Hospital Course:  Toby Hoffman is a 63 y.o. female with a past medical history of hypertension, hyperlipidemia, obesity, anxiety/depression, and pre-diabetes who was admitted to the ICU on  with severe symptomatic hyponatremia with initial serum sodium of 114 associated with metabolic encephalopathy and symptoms of lethargy, fatigue, and sleep difficulties. Pt was treated for SIADH due to medications with prior use of celexa and elavil (she had no evidence of active intracranial or intrathoracic pathology). These medications were discontinued and she was placed on a fluid restriction and salt tablets with gradual improvement to her serum sodium up to 129 on day of discharge. Salt tabs were stopped at discharge but she will continue the fluid restriction until she has a repeat serum sodium later this week. Patient will not resume celexa nor elavil at time of discharge.    For patient's anxiety, I recommend that she follow up with her PCP for initiation of an alternate maintenance medication, such as buspirone, when her serum sodium has normalized. I have given her a 7 day supply of low dose xanax to help her with breakthrough anxiety symptoms. She tolerated this medication well during her  hospitalization.     Patient will resume her home medications otherwise at time of discharge.    Patient speaks Swedish and she preferred to use her son and daughter-in-law for translation during her stay. On day of discharge, I offered her translation services which she declined.       Discharge Follow Up Recommendations for labs/diagnostics:  1. Pt needs a repeat BMP to check her serum sodium on 12/6.  2. Pt needs a follow up with her PCP, Hayden Curry MD, within one week. At that visit she can discuss alternate anxiety medications.     Day of Discharge     HPI:   F/u low sodium    Pt is doing well. Denies headache, vision changes, confusion, dyspnea, or chest pain. Agreeable to discharge home.     Review of Systems  Otherwise ROS is negative except as mentioned in the HPI.    Vital Signs:   Temp:  [96.2 °F (35.7 °C)-98.9 °F (37.2 °C)] 97.9 °F (36.6 °C)  Heart Rate:  [71-81] 71  Resp:  [16-18] 18  BP: (112-146)/(69-89) 112/72     Physical Exam:  Constitutional: No acute distress, awake, alert  HENT: NCAT, mucous membranes moist  Respiratory: Clear to auscultation bilaterally, respiratory effort normal   Cardiovascular: RRR, no murmurs, rubs, or gallops, palpable pedal pulses bilaterally  Gastrointestinal: Positive bowel sounds, soft, nontender, nondistended, obese  Musculoskeletal: No bilateral ankle edema  Psychiatric: Appropriate affect, cooperative  Neurologic: Strength symmetric in all extremities, Cranial Nerves grossly intact to confrontation, speech clear  Skin: No rashes      Pertinent  and/or Most Recent Results     Results from last 7 days   Lab Units  12/04/18   0800  12/04/18   0015  12/03/18   1622  12/03/18   0532  12/02/18   2344  12/02/18   1803  12/02/18   1221  12/02/18   0551   11/30/18   0056   11/29/18   1518   WBC 10*3/mm3   --    --    --   7.19   --    --    --   6.25   --   7.01   --   8.12   HEMOGLOBIN g/dL   --    --    --   11.1*   --    --    --   11.3*   --   11.5   --   11.6    HEMATOCRIT %   --    --    --   33.4*   --    --    --   34.1*   --   33.4*   --   34.0*   PLATELETS 10*3/mm3   --    --    --   346   --    --    --   325   --   321   --   333   SODIUM mmol/L  130*  130*  128*  129*  129*  128*  128*  128*   < >  122*   < >  114*   POTASSIUM mmol/L   --    --    --   4.4   --    --    --   4.7   --   4.1   --   4.2   CHLORIDE mmol/L   --    --    --   101   --    --    --   99   --   92*   --   84*   CO2 mmol/L   --    --    --   22.0   --    --    --   23.0   --   26.0   --   25.0   BUN mg/dL   --    --    --   11   --    --    --   9   --   8*   --   12   CREATININE mg/dL   --    --    --   0.70   --    --    --   0.65   --   0.57*   --   0.62   GLUCOSE mg/dL   --    --    --   98   --    --    --   101*   --   93   --   95   CALCIUM mg/dL   --    --    --   8.5*   --    --    --   8.9   --   8.5*   --   9.0    < > = values in this interval not displayed.     Results from last 7 days   Lab Units  11/30/18   0056  11/29/18   1518   BILIRUBIN mg/dL  0.6  0.4   ALK PHOS U/L  102*  112*   ALT (SGPT) U/L  13  12   AST (SGOT) U/L  14  14           Invalid input(s): TG, LDLCALC, LDLREALC  Results from last 7 days   Lab Units  11/29/18   1518   TSH mIU/mL  3.617   CORTISOL mcg/dL  10.80     Brief Urine Lab Results  (Last result in the past 365 days)      Color   Clarity   Blood   Leuk Est   Nitrite   Protein   CREAT   Urine HCG        11/29/18 1556             12.4       11/29/18 1556 Yellow Clear Trace Negative Negative Negative               Microbiology Results Abnormal     None          Imaging Results (all)     Procedure Component Value Units Date/Time    CT Head Without Contrast [[id number]] Collected:  11/29/18 1836     Updated:  11/29/18 1846    Narrative:       EXAMINATION: CT HEAD WO CONTRAST - 11/29/2018     INDICATION:  E87.9-Rjoj-zfnijgisuo and hyponatremia; R53.1-Weakness;  R53.83-Other fatigue; Z86.59-Personal history of other mental and  behavioral disorders;  I10-Essential (primary) hypertension; E87.8-Other  disorders of electrolyte and fluid balance, not elsewhere classified.     TECHNIQUE:  Axial CT data of the head were acquired helically without  contrast. Multiplanar reformatted images were generated and reviewed.  The radiation dose reduction device was turned on for each scan per the  ALARA (As Low as Reasonably Achievable) protocol.     COMPARISONS:  None.     FINDINGS:  There is no CT evidence of acute infarction, intracranial  hemorrhage or hydrocephalus. Senescent calcifications noted in the basal  ganglia bilaterally. There is no large mass or significant mass effect  and the basal cisterns are patent.  There is no calvarial destructive  lesion or fracture.  Sinonasal and temporal bone structures are without  significant fluid.       Impression:       No acute intracranial abnormality.      DICTATED:   11/29/2018  EDITED/ls :   11/29/2018      This report was finalized on 11/29/2018 6:44 PM by Godwin Ross.       XR Chest PA & Lateral [[id number]] Collected:  11/29/18 1536     Updated:  11/29/18 1549    Narrative:       EXAMINATION: XR CHEST PA AND LATERAL- 11/29/2018     INDICATION: Weak/Dizzy/AMS triage protocol     TECHNIQUE: Two view chest.     COMPARISONS: None.     FINDINGS: Lungs are without focal opacity. No pleural effusion or  pneumothorax. Cardiomediastinal silhouette is within normal limits.       Impression:       No acute cardiopulmonary abnormality.     D:  11/29/2018  E:  11/29/2018     This report was finalized on 11/29/2018 3:47 PM by Godwin Ross.                              Discharge Details        Discharge Medications      New Medications      Instructions Start Date   ALPRAZolam 0.25 MG tablet  Commonly known as:  XANAX   0.25 mg, Oral, Daily PRN         Continue These Medications      Instructions Start Date   AMBIEN 5 MG tablet  Generic drug:  zolpidem   5 mg, Oral, Nightly PRN      aspirin 81 MG EC tablet   81 mg, Oral, Daily       atorvastatin 10 MG tablet  Commonly known as:  LIPITOR   10 mg, Oral, Nightly      CALCIUM PO   1 tablet, Oral, Daily      lisinopril 10 MG tablet  Commonly known as:  PRINIVIL,ZESTRIL   10 mg, Oral, Daily      metoprolol tartrate 25 MG tablet  Commonly known as:  LOPRESSOR   25 mg, Oral, 2 Times Daily      omeprazole 20 MG capsule  Commonly known as:  priLOSEC   20 mg, Oral, 2 Times Daily         Stop These Medications    citalopram 10 MG tablet  Commonly known as:  CeleXA     nystatin 217122 UNIT/ML suspension  Commonly known as:  MYCOSTATIN              Discharge Disposition:  Home or Self Care    Discharge Diet:  Diet Instructions     Diet: Regular      Discharge Diet:  Regular    Fluid Restriction per day:  1500 mL Fluid          Discharge Activity:   Activity Instructions     Activity as Tolerated            Code Status/Level of Support:  Code Status and Medical Interventions:   Ordered at: 11/29/18 0373     Code Status:    CPR     Medical Interventions (Level of Support Prior to Arrest):    Full       No future appointments.    Additional Instructions for the Follow-ups that You Need to Schedule     Discharge Follow-up with PCP   As directed       Currently Documented PCP:    Hayden Curry MD    PCP Phone Number:    [phone]     Follow Up Details:  Pt needs a BMP checked on 12/6 and follow up with PCP on that day or within 1 week               Time Spent on Discharge:  35 minutes.    I spoke to the patient's son over the phone at time of discharge regarding plan of care. All questions answered to the best of my ability.     Electronically signed by Michael Stapleton MD, 12/04/18, 10:59 AM.

## 2018-12-04 NOTE — PLAN OF CARE
Problem: Patient Care Overview  Goal: Plan of Care Review  Outcome: Ongoing (interventions implemented as appropriate)   12/04/18 0359   Plan of Care Review   Progress improving   OTHER   Outcome Summary pt rested well, family at bedside, no c/o    Coping/Psychosocial   Plan of Care Reviewed With patient     Goal: Individualization and Mutuality  Outcome: Ongoing (interventions implemented as appropriate)    Goal: Discharge Needs Assessment  Outcome: Ongoing (interventions implemented as appropriate)    Goal: Interprofessional Rounds/Family Conf  Outcome: Ongoing (interventions implemented as appropriate)

## 2018-12-05 ENCOUNTER — READMISSION MANAGEMENT (OUTPATIENT)
Dept: CALL CENTER | Facility: HOSPITAL | Age: 63
End: 2018-12-05

## 2018-12-05 NOTE — OUTREACH NOTE
Prep Survey      Responses   Facility patient discharged from?  [facility]   Is patient eligible?  Yes   Discharge diagnosis  hyponatremia, essential HTN, pre-diabetes, depression, anxiety disorder, HLD, metabolic encephalopathy   Does the patient have one of the following disease processes/diagnoses(primary or secondary)?  Other   Does the patient have Home health ordered?  No   Is there a DME ordered?  No   Comments regarding appointments  See AVS   General alerts for this patient  Pt speaks Divehi, will need .    Prep survey completed?  Yes          Marianna James RN

## 2018-12-05 NOTE — PAYOR COMM NOTE
"Toby Hoffman (63 y.o. Female)     Date of Birth Social Security Number Address Home Phone MRN    1955  [address] [facility] [phone] [id number]    [employer] Marital Status          None        Admission Date Admission Type Admitting Provider Attending Provider Department, Room/Bed    18 Emergency Michael Stapleton MD  [facility], [address]/1    Discharge Date Discharge Disposition Discharge Destination        2018 Home or Self Care              Attending Provider:  (none)   Allergies:  No Known Allergies    Isolation:  None   Infection:  None   Code Status:  Prior    Ht:  160 cm (62.99\")   Wt:  76.4 kg (168 lb 6 oz)    Admission Cmt:  None   Principal Problem:  Hyponatremia [E87.1]                 Active Insurance as of 2018     Primary Coverage     Payor Plan Insurance Group Employer/Plan Group    PASSPORT PASSPORT MEDICAID     Payor Plan Address Payor Plan Phone Number Payor Plan Fax Number Effective Dates    [address] [phone]  1997 - None Entered    [facility] [address]       Subscriber Name Subscriber Birth Date Member ID       TOBY HOFFMAN 1955 [id number]                 Emergency Contacts      (Rel.) Home Phone Work Phone Mobile Phone    Umang Hoffman (Son) -- -- [phone]    Cory Hoffman (Son) -- -- [phone]               Discharge Summary      Michael Stapleton MD at 2018 10:59 AM              [facility] Medicine Services  DISCHARGE SUMMARY    Patient Name: Toby Hoffman  : 1955  MRN: [id number]    Date of Admission: 2018  Date of Discharge:  18    Primary Care Physician: Hayden Curry MD    Consults     No orders found from 10/31/2018 to 2018.        Hospital Course     Presenting Problem:   Hyponatremia [E87.1]    Active Hospital Problems    Diagnosis Date Noted   • **Hyponatremia [E87.1] 2018   • Essential hypertension [I10] 2018 "   • Pre-diabetes [R73.03] 06/28/2018   • Depression [F32.9] 06/28/2018   • Anxiety disorder [F41.9] 06/28/2018   • Hyperlipidemia [E78.5] 06/28/2018      Resolved Hospital Problems   No resolved problems to display.      Hospital Course:  Toby Hoffman is a 63 y.o. female with a past medical history of hypertension, hyperlipidemia, obesity, anxiety/depression, and pre-diabetes who was admitted to the ICU on 11/29 with severe symptomatic hyponatremia with initial serum sodium of 114 associated with metabolic encephalopathy and symptoms of lethargy, fatigue, and sleep difficulties. Pt was treated for SIADH due to medications with prior use of celexa and elavil (she had no evidence of active intracranial or intrathoracic pathology). These medications were discontinued and she was placed on a fluid restriction and salt tablets with gradual improvement to her serum sodium up to 129 on day of discharge. Salt tabs were stopped at discharge but she will continue the fluid restriction until she has a repeat serum sodium later this week. Patient will not resume celexa nor elavil at time of discharge.    For patient's anxiety, I recommend that she follow up with her PCP for initiation of an alternate maintenance medication, such as buspirone, when her serum sodium has normalized. I have given her a 7 day supply of low dose xanax to help her with breakthrough anxiety symptoms. She tolerated this medication well during her hospitalization.     Patient will resume her home medications otherwise at time of discharge.    Patient speaks Italian and she preferred to use her son and daughter-in-law for translation during her stay. On day of discharge, I offered her translation services which she declined.       Discharge Follow Up Recommendations for labs/diagnostics:  1. Pt needs a repeat BMP to check her serum sodium on 12/6.  2. Pt needs a follow up with her PCP, Hayden Curry MD, within one week. At that visit she can discuss  alternate anxiety medications.     Day of Discharge     HPI:   F/u low sodium    Pt is doing well. Denies headache, vision changes, confusion, dyspnea, or chest pain. Agreeable to discharge home.     Review of Systems  Otherwise ROS is negative except as mentioned in the HPI.    Vital Signs:   Temp:  [96.2 °F (35.7 °C)-98.9 °F (37.2 °C)] 97.9 °F (36.6 °C)  Heart Rate:  [71-81] 71  Resp:  [16-18] 18  BP: (112-146)/(69-89) 112/72     Physical Exam:  Constitutional: No acute distress, awake, alert  HENT: NCAT, mucous membranes moist  Respiratory: Clear to auscultation bilaterally, respiratory effort normal   Cardiovascular: RRR, no murmurs, rubs, or gallops, palpable pedal pulses bilaterally  Gastrointestinal: Positive bowel sounds, soft, nontender, nondistended, obese  Musculoskeletal: No bilateral ankle edema  Psychiatric: Appropriate affect, cooperative  Neurologic: Strength symmetric in all extremities, Cranial Nerves grossly intact to confrontation, speech clear  Skin: No rashes      Pertinent  and/or Most Recent Results     Results from last 7 days   Lab Units  12/04/18   0800  12/04/18   0015  12/03/18   1622  12/03/18   0532  12/02/18   2344  12/02/18   1803  12/02/18   1221  12/02/18   0551   11/30/18   0056   11/29/18   1518   WBC 10*3/mm3   --    --    --   7.19   --    --    --   6.25   --   7.01   --   8.12   HEMOGLOBIN g/dL   --    --    --   11.1*   --    --    --   11.3*   --   11.5   --   11.6   HEMATOCRIT %   --    --    --   33.4*   --    --    --   34.1*   --   33.4*   --   34.0*   PLATELETS 10*3/mm3   --    --    --   346   --    --    --   325   --   321   --   333   SODIUM mmol/L  130*  130*  128*  129*  129*  128*  128*  128*   < >  122*   < >  114*   POTASSIUM mmol/L   --    --    --   4.4   --    --    --   4.7   --   4.1   --   4.2   CHLORIDE mmol/L   --    --    --   101   --    --    --   99   --   92*   --   84*   CO2 mmol/L   --    --    --   22.0   --    --    --   23.0   --   26.0   --    25.0   BUN mg/dL   --    --    --   11   --    --    --   9   --   8*   --   12   CREATININE mg/dL   --    --    --   0.70   --    --    --   0.65   --   0.57*   --   0.62   GLUCOSE mg/dL   --    --    --   98   --    --    --   101*   --   93   --   95   CALCIUM mg/dL   --    --    --   8.5*   --    --    --   8.9   --   8.5*   --   9.0    < > = values in this interval not displayed.     Results from last 7 days   Lab Units  11/30/18   0056  11/29/18   1518   BILIRUBIN mg/dL  0.6  0.4   ALK PHOS U/L  102*  112*   ALT (SGPT) U/L  13  12   AST (SGOT) U/L  14  14           Invalid input(s): TG, LDLCALC, LDLREALC  Results from last 7 days   Lab Units  11/29/18   1518   TSH mIU/mL  3.617   CORTISOL mcg/dL  10.80     Brief Urine Lab Results  (Last result in the past 365 days)      Color   Clarity   Blood   Leuk Est   Nitrite   Protein   CREAT   Urine HCG        11/29/18 1556             12.4       11/29/18 1556 Yellow Clear Trace Negative Negative Negative               Microbiology Results Abnormal     None          Imaging Results (all)     Procedure Component Value Units Date/Time    CT Head Without Contrast [[id number]] Collected:  11/29/18 1836     Updated:  11/29/18 1846    Narrative:       EXAMINATION: CT HEAD WO CONTRAST - 11/29/2018     INDICATION:  E87.6-Apcw-pmatnaixcn and hyponatremia; R53.1-Weakness;  R53.83-Other fatigue; Z86.59-Personal history of other mental and  behavioral disorders; I10-Essential (primary) hypertension; E87.8-Other  disorders of electrolyte and fluid balance, not elsewhere classified.     TECHNIQUE:  Axial CT data of the head were acquired helically without  contrast. Multiplanar reformatted images were generated and reviewed.  The radiation dose reduction device was turned on for each scan per the  ALARA (As Low as Reasonably Achievable) protocol.     COMPARISONS:  None.     FINDINGS:  There is no CT evidence of acute infarction, intracranial  hemorrhage or hydrocephalus. Senescent  calcifications noted in the basal  ganglia bilaterally. There is no large mass or significant mass effect  and the basal cisterns are patent.  There is no calvarial destructive  lesion or fracture.  Sinonasal and temporal bone structures are without  significant fluid.       Impression:       No acute intracranial abnormality.      DICTATED:   11/29/2018  EDITED/ls :   11/29/2018      This report was finalized on 11/29/2018 6:44 PM by Godwin Ross.       XR Chest PA & Lateral [[id number]] Collected:  11/29/18 1536     Updated:  11/29/18 1549    Narrative:       EXAMINATION: XR CHEST PA AND LATERAL- 11/29/2018     INDICATION: Weak/Dizzy/AMS triage protocol     TECHNIQUE: Two view chest.     COMPARISONS: None.     FINDINGS: Lungs are without focal opacity. No pleural effusion or  pneumothorax. Cardiomediastinal silhouette is within normal limits.       Impression:       No acute cardiopulmonary abnormality.     D:  11/29/2018  E:  11/29/2018     This report was finalized on 11/29/2018 3:47 PM by Godwin Ross.                              Discharge Details        Discharge Medications      New Medications      Instructions Start Date   ALPRAZolam 0.25 MG tablet  Commonly known as:  XANAX   0.25 mg, Oral, Daily PRN         Continue These Medications      Instructions Start Date   AMBIEN 5 MG tablet  Generic drug:  zolpidem   5 mg, Oral, Nightly PRN      aspirin 81 MG EC tablet   81 mg, Oral, Daily      atorvastatin 10 MG tablet  Commonly known as:  LIPITOR   10 mg, Oral, Nightly      CALCIUM PO   1 tablet, Oral, Daily      lisinopril 10 MG tablet  Commonly known as:  PRINIVIL,ZESTRIL   10 mg, Oral, Daily      metoprolol tartrate 25 MG tablet  Commonly known as:  LOPRESSOR   25 mg, Oral, 2 Times Daily      omeprazole 20 MG capsule  Commonly known as:  priLOSEC   20 mg, Oral, 2 Times Daily         Stop These Medications    citalopram 10 MG tablet  Commonly known as:  CeleXA     nystatin 951412 UNIT/ML suspension  Commonly  known as:  MYCOSTATIN              Discharge Disposition:  Home or Self Care    Discharge Diet:  Diet Instructions     Diet: Regular      Discharge Diet:  Regular    Fluid Restriction per day:  1500 mL Fluid          Discharge Activity:   Activity Instructions     Activity as Tolerated            Code Status/Level of Support:  Code Status and Medical Interventions:   Ordered at: 11/29/18 9092     Code Status:    CPR     Medical Interventions (Level of Support Prior to Arrest):    Full       No future appointments.    Additional Instructions for the Follow-ups that You Need to Schedule     Discharge Follow-up with PCP   As directed       Currently Documented PCP:    Hayden Curry MD    PCP Phone Number:    [phone]     Follow Up Details:  Pt needs a BMP checked on 12/6 and follow up with PCP on that day or within 1 week               Time Spent on Discharge:  35 minutes.    I spoke to the patient's son over the phone at time of discharge regarding plan of care. All questions answered to the best of my ability.     Electronically signed by Michael Stapleton MD, 12/04/18, 10:59 AM.        Electronically signed by Michael Stapleton MD at 12/4/2018 11:07 AM

## 2018-12-06 ENCOUNTER — READMISSION MANAGEMENT (OUTPATIENT)
Dept: CALL CENTER | Facility: HOSPITAL | Age: 63
End: 2018-12-06

## 2018-12-06 NOTE — OUTREACH NOTE
Medical Week 1 Survey      Responses   Facility patient discharged from?  [facility]   Does the patient have one of the following disease processes/diagnoses(primary or secondary)?  Other   Is there a successful TCM telephone encounter documented?  No   Week 1 attempt successful?  Yes   Call start time  0810   Call end time  0817   General alerts for this patient  Pt speaks Yi, will need .    Discharge diagnosis  hyponatremia, essential HTN, pre-diabetes, depression, anxiety disorder, HLD, metabolic encephalopathy   Is patient permission given to speak with other caregiver?  Yes   Person spoke with today (if not patient) and relationship     Meds reviewed with patient/caregiver?  Yes   Is the patient having any side effects they believe may be caused by any medication additions or changes?  No   Does the patient have all medications ordered at discharge?  Yes   Is the patient taking all medications as directed (includes completed medication regime)?  Yes   Does the patient have a primary care provider?   Yes   Does the patient have an appointment with their PCP within 7 days of discharge?  Yes   Has the patient kept scheduled appointments due by today?  Yes   Comments  going today   Has home health visited the patient within 72 hours of discharge?  Yes   Psychosocial issues?  No   Did the patient receive a copy of their discharge instructions?  Yes   Nursing interventions  Reviewed instructions with patient   What is the patient's perception of their health status since discharge?  Improving   Is the patient/caregiver able to teach back signs and symptoms related to disease process for when to call PCP?  Yes   Is the patient/caregiver able to teach back signs and symptoms related to disease process for when to call 911?  Yes   Is the patient/caregiver able to teach back the hierarchy of who to call/visit for symptoms/problems? PCP, Specialist, Home health nurse, Urgent Care, ED, 911  Yes   Week  1 call completed?  Yes   Graduated  Yes   Did the patient feel the follow up calls were helpful during their recovery period?  Yes   Was the number of calls appropriate?  Yes   Wrap up additional comments  Patient going to see her PCP today and also has a transition visit scheduled.          Svetlana Gordon RN

## 2019-09-13 ENCOUNTER — TRANSCRIBE ORDERS (OUTPATIENT)
Dept: ADMINISTRATIVE | Facility: HOSPITAL | Age: 64
End: 2019-09-13

## 2019-09-13 DIAGNOSIS — Z12.31 VISIT FOR SCREENING MAMMOGRAM: Primary | ICD-10-CM

## 2021-05-11 ENCOUNTER — TRANSCRIBE ORDERS (OUTPATIENT)
Dept: ADMINISTRATIVE | Facility: HOSPITAL | Age: 66
End: 2021-05-11

## 2021-05-11 DIAGNOSIS — Z12.31 VISIT FOR SCREENING MAMMOGRAM: Primary | ICD-10-CM

## 2021-05-11 DIAGNOSIS — M81.0 OSTEOPOROSIS, POST-MENOPAUSAL: ICD-10-CM

## 2021-06-28 ENCOUNTER — OFFICE VISIT (OUTPATIENT)
Dept: ENDOCRINOLOGY | Facility: CLINIC | Age: 66
End: 2021-06-28

## 2021-06-28 VITALS
HEIGHT: 64 IN | WEIGHT: 171 LBS | DIASTOLIC BLOOD PRESSURE: 70 MMHG | HEART RATE: 85 BPM | SYSTOLIC BLOOD PRESSURE: 124 MMHG | OXYGEN SATURATION: 98 % | BODY MASS INDEX: 29.19 KG/M2

## 2021-06-28 DIAGNOSIS — E05.20 TOXIC MULTINODUL GOITER: Primary | ICD-10-CM

## 2021-06-28 PROCEDURE — 99203 OFFICE O/P NEW LOW 30 MIN: CPT | Performed by: INTERNAL MEDICINE

## 2021-06-28 RX ORDER — ALPRAZOLAM 0.25 MG/1
TABLET ORAL
COMMUNITY
Start: 2021-06-09

## 2021-06-28 NOTE — PROGRESS NOTES
"     Office Note      Date: 2021  Patient Name: Toby Hoffman  MRN: [id number]  : 1955    Chief Complaint   Patient presents with   • Thyroid Problem     Nodule       History of Present Illness:   Toby Hoffman is a 66 y.o. female who presents for Thyroid Problem (Nodule)  she is seen today as a new patient   -----------------------------------------  She went in for a routine physical and  Her tsh was found to be low   She then had an ultrasound which showed 2 small, isoechoic nodules.  There is no prior hx of thyroid disease.  ----------------------------  No temperature intolerance.  Sometimes warm at night.  No tremor.   A little tired. Not more irritable than usual  Lost a little weight with exercise   No rapid heart beat or palpitations.        Subjective      Patient was born where: born in [place] .  Facial radiation exposure: No.  High iodine intake: No  Family hx of thyroid disease: No.    Review of Systems:   Review of Systems   Constitutional: Negative for fatigue and unexpected weight change.   Endocrine: Positive for heat intolerance.   All other systems reviewed and are negative.      The following portions of the patient's history were reviewed and updated as appropriate: allergies, current medications, past family history, past medical history, past social history, past surgical history and problem list.    Objective     Visit Vitals  /70 (BP Location: Left arm, Patient Position: Sitting, Cuff Size: Adult)   Pulse 85   Ht 162.6 cm (64\")   Wt 77.6 kg (171 lb)   SpO2 98%   BMI 29.35 kg/m²       Labs:    CBC w/DIFF  Lab Results   Component Value Date    WBC 7.19 2018    RBC 3.85 (L) 2018    HGB 11.1 (L) 2018    HCT 33.4 (L) 2018    MCV 86.8 2018    MCH 28.8 2018    MCHC 33.2 2018    RDW 13.2 2018    RDWSD 41.9 2018    MPV 9.8 2018     2018    NEUTRORELPCT 60.0 2018    LYMPHORELPCT 25.6 2018    " MONORELPCT 8.3 12/03/2018    EOSRELPCT 5.7 (H) 12/03/2018    BASORELPCT 0.4 12/03/2018    AUTOIGPER 0.1 12/03/2018    NEUTROABS 4.31 12/03/2018    LYMPHSABS 1.84 12/03/2018    MONOSABS 0.60 12/03/2018    EOSABS 0.41 (H) 12/03/2018    BASOSABS 0.03 12/03/2018    AUTOIGNUM 0.01 12/03/2018       T4  Free T4   Date Value Ref Range Status   11/29/2018 1.08 0.89 - 1.76 ng/dL Final       TSH  No results found for: TSHBASE     Physical Exam:  Physical Exam  Vitals reviewed.   Constitutional:       Appearance: Normal appearance.   Eyes:      Extraocular Movements: Extraocular movements intact.   Neck:      Comments: Thyroid is hard and irregular in texture   Cardiovascular:      Rate and Rhythm: Normal rate and regular rhythm.   Pulmonary:      Effort: Pulmonary effort is normal. No respiratory distress.   Musculoskeletal:      Comments: No tremor   Lymphadenopathy:      Cervical: No cervical adenopathy.   Neurological:      Mental Status: She is alert and oriented to person, place, and time.   Psychiatric:         Mood and Affect: Mood normal.         Thought Content: Thought content normal.         Judgment: Judgment normal.         Assessment / Plan      Assessment & Plan:  Problem List Items Addressed This Visit        Other    Toxic multinodul goiter - Primary    Current Assessment & Plan     Toxic mng with subclinical hyperthyroidism    - low tsh with normal ft4 and minimal symptoms. tsh is low but detectable.  The hyperechoic nodules are small and not malignant.    At this point, no treatment is needed. She needs tft's every 4 months or so and neck exam at least annually.  If her tsh becomes undetectable or free t4 becomes elevated or if she becomes more symptomatic then treatment with NICOLE, surgery or methimazole could be offered.         Relevant Medications    metoprolol tartrate (LOPRESSOR) 25 MG tablet           Joaquim Lee MD   06/28/2021

## 2021-06-28 NOTE — ASSESSMENT & PLAN NOTE
Toxic mng with subclinical hyperthyroidism    - low tsh with normal ft4 and minimal symptoms. tsh is low but detectable.  The hyperechoic nodules are small and not malignant.    At this point, no treatment is needed. She needs tft's every 4 months or so and neck exam at least annually.  If her tsh becomes undetectable or free t4 becomes elevated or if she becomes more symptomatic then treatment with NICOLE, surgery or methimazole could be offered.

## 2021-08-19 ENCOUNTER — TELEPHONE (OUTPATIENT)
Dept: ENDOCRINOLOGY | Facility: CLINIC | Age: 66
End: 2021-08-19

## 2021-09-01 ENCOUNTER — OFFICE VISIT (OUTPATIENT)
Dept: ENDOCRINOLOGY | Facility: CLINIC | Age: 66
End: 2021-09-01

## 2021-09-01 VITALS
SYSTOLIC BLOOD PRESSURE: 118 MMHG | OXYGEN SATURATION: 98 % | HEART RATE: 90 BPM | HEIGHT: 64 IN | BODY MASS INDEX: 28.34 KG/M2 | DIASTOLIC BLOOD PRESSURE: 68 MMHG | WEIGHT: 166 LBS

## 2021-09-01 DIAGNOSIS — E05.20 TOXIC MULTINODUL GOITER: Primary | ICD-10-CM

## 2021-09-01 PROCEDURE — 99214 OFFICE O/P EST MOD 30 MIN: CPT | Performed by: INTERNAL MEDICINE

## 2021-09-01 RX ORDER — METHIMAZOLE 10 MG/1
10 TABLET ORAL DAILY
Qty: 90 TABLET | Refills: 3 | Status: SHIPPED | OUTPATIENT
Start: 2021-09-01 | End: 2021-11-08 | Stop reason: SDUPTHER

## 2021-09-01 NOTE — ASSESSMENT & PLAN NOTE
Worsened. tsh now completely suppressed and t3 high with increased signs and symptoms of hyperthyroidism  We reviewed treatment options of surgery, I 131 therapy and medications. Surgery not a great option as she has nodules in each lobe. I favor medications to control it  We discussed use of methimazole and reviewed side effects in detail

## 2021-09-01 NOTE — PROGRESS NOTES
"     Office Note      Date: 2021  Patient Name: Toby Hoffman  MRN: [id number]  : 1955    Chief Complaint   Patient presents with   • Goiter       History of Present Illness:   Toby Hoffman is a 66 y.o. female who presents for Goiter   I had assessed her as having a toxic mng with subclinical hyperthyroidism but follow up labs showed tsh was completely suppressed and free T3 was markedly high  She has lost 5 pounds.     Subjective        Review of Systems:   Review of Systems   Constitutional: Positive for fatigue and unexpected weight change.   Cardiovascular: Positive for palpitations.   Endocrine: Positive for heat intolerance.       The following portions of the patient's history were reviewed and updated as appropriate: allergies, current medications, past family history, past medical history, past social history, past surgical history and problem list.    Objective     Visit Vitals  /68 (BP Location: Left arm, Patient Position: Sitting, Cuff Size: Adult)   Pulse 90   Ht 162.6 cm (64\")   Wt 75.3 kg (166 lb)   SpO2 98%   BMI 28.49 kg/m²       Labs:    CBC w/DIFF  Lab Results   Component Value Date    WBC 7.19 2018    RBC 3.85 (L) 2018    HGB 11.1 (L) 2018    HCT 33.4 (L) 2018    MCV 86.8 2018    MCH 28.8 2018    MCHC 33.2 2018    RDW 13.2 2018    RDWSD 41.9 2018    MPV 9.8 2018     2018    NEUTRORELPCT 60.0 2018    LYMPHORELPCT 25.6 2018    MONORELPCT 8.3 2018    EOSRELPCT 5.7 (H) 2018    BASORELPCT 0.4 2018    AUTOIGPER 0.1 2018    NEUTROABS 4.31 2018    LYMPHSABS 1.84 2018    MONOSABS 0.60 2018    EOSABS 0.41 (H) 2018    BASOSABS 0.03 2018    AUTOIGNUM 0.01 2018       T4  Free T4   Date Value Ref Range Status   2018 1.08 0.89 - 1.76 ng/dL Final       TSH  No results found for: TSHBASE     Physical Exam:  Physical Exam  Vitals reviewed. "   Constitutional:       Appearance: Normal appearance.   Eyes:      Extraocular Movements: Extraocular movements intact.   Neck:      Comments: Irregular goiter  Musculoskeletal:      Comments: tremor   Lymphadenopathy:      Cervical: No cervical adenopathy.   Neurological:      Mental Status: She is alert.         Assessment / Plan      Assessment & Plan:  Problem List Items Addressed This Visit        Other    Toxic multinodul goiter - Primary    Current Assessment & Plan     Worsened. tsh now completely suppressed and t3 high with increased signs and symptoms of hyperthyroidism  We reviewed treatment options of surgery, I 131 therapy and medications. Surgery not a great option as she has nodules in each lobe. I favor medications to control it  We discussed use of methimazole and reviewed side effects in detail          Relevant Medications    metoprolol tartrate (LOPRESSOR) 25 MG tablet    methIMAzole (TAPAZOLE) 10 MG tablet    Other Relevant Orders    Comprehensive Metabolic Panel    TSH    T4, Free    CBC & Differential           Joaquim Lee MD   09/01/2021

## 2021-11-02 ENCOUNTER — APPOINTMENT (OUTPATIENT)
Dept: BONE DENSITY | Facility: HOSPITAL | Age: 66
End: 2021-11-02

## 2021-11-02 ENCOUNTER — APPOINTMENT (OUTPATIENT)
Dept: MAMMOGRAPHY | Facility: HOSPITAL | Age: 66
End: 2021-11-02

## 2021-11-08 RX ORDER — METHIMAZOLE 10 MG/1
10 TABLET ORAL DAILY
Qty: 90 TABLET | Refills: 0 | Status: SHIPPED | OUTPATIENT
Start: 2021-11-08 | End: 2022-05-02

## 2021-11-08 NOTE — TELEPHONE ENCOUNTER
PLEASE CALL IN FOR PT HER THYROID MED TO [facility]    PLEASE CALL THIS NUMBER IF YOU HAVE QUESTIONS  [phone]

## 2022-03-21 ENCOUNTER — TELEPHONE (OUTPATIENT)
Dept: ENDOCRINOLOGY | Facility: CLINIC | Age: 67
End: 2022-03-21

## 2022-04-29 ENCOUNTER — APPOINTMENT (OUTPATIENT)
Dept: MAMMOGRAPHY | Facility: HOSPITAL | Age: 67
End: 2022-04-29

## 2022-05-02 ENCOUNTER — OFFICE VISIT (OUTPATIENT)
Dept: ENDOCRINOLOGY | Facility: CLINIC | Age: 67
End: 2022-05-02

## 2022-05-02 ENCOUNTER — LAB (OUTPATIENT)
Dept: LAB | Facility: HOSPITAL | Age: 67
End: 2022-05-02

## 2022-05-02 VITALS
WEIGHT: 170.2 LBS | DIASTOLIC BLOOD PRESSURE: 60 MMHG | SYSTOLIC BLOOD PRESSURE: 106 MMHG | HEART RATE: 91 BPM | HEIGHT: 64 IN | BODY MASS INDEX: 29.06 KG/M2 | OXYGEN SATURATION: 96 %

## 2022-05-02 DIAGNOSIS — E05.20 TOXIC MULTINODUL GOITER: ICD-10-CM

## 2022-05-02 DIAGNOSIS — E05.20 TOXIC MULTINODUL GOITER: Primary | ICD-10-CM

## 2022-05-02 PROCEDURE — 99213 OFFICE O/P EST LOW 20 MIN: CPT | Performed by: INTERNAL MEDICINE

## 2022-05-02 PROCEDURE — 84439 ASSAY OF FREE THYROXINE: CPT

## 2022-05-02 PROCEDURE — 84443 ASSAY THYROID STIM HORMONE: CPT

## 2022-05-02 NOTE — PROGRESS NOTES
"     Office Note      Date: 2022  Patient Name: Toby Hoffman  MRN: [id number]  : 1955    Chief Complaint   Patient presents with   • Goiter       History of Present Illness:   Toby Hoffman is a 67 y.o. female who presents for Goiter  and hyperthyroidism. 6 weeks ago her tsh was 30 and I had to have her stop taking methimazole 10 mg per day  Since then she has not had temperature intolerance.  No heart racing.  No change in her trouble sleeping  No really more irritable than usual  No hand  Tremor      Subjective        Review of Systems:   Review of Systems   Constitutional: Negative.    HENT: Negative.    Endocrine: Negative.        The following portions of the patient's history were reviewed and updated as appropriate: allergies, current medications, past family history, past medical history, past social history, past surgical history and problem list.    Objective     Visit Vitals  /60   Pulse 91   Ht 162.6 cm (64\")   Wt 77.2 kg (170 lb 3.2 oz)   SpO2 96%   BMI 29.21 kg/m²       Labs:    CBC w/DIFF  Lab Results   Component Value Date    WBC 7.19 2018    RBC 3.85 (L) 2018    HGB 11.1 (L) 2018    HCT 33.4 (L) 2018    MCV 86.8 2018    MCH 28.8 2018    MCHC 33.2 2018    RDW 13.2 2018    RDWSD 41.9 2018    MPV 9.8 2018     2018    NEUTRORELPCT 60.0 2018    LYMPHORELPCT 25.6 2018    MONORELPCT 8.3 2018    EOSRELPCT 5.7 (H) 2018    BASORELPCT 0.4 2018    AUTOIGPER 0.1 2018    NEUTROABS 4.31 2018    LYMPHSABS 1.84 2018    MONOSABS 0.60 2018    EOSABS 0.41 (H) 2018    BASOSABS 0.03 2018    AUTOIGNUM 0.01 2018       T4  Free T4   Date Value Ref Range Status   2018 1.08 0.89 - 1.76 ng/dL Final       TSH  No results found for: TSHBASE     Physical Exam:  Physical Exam  Vitals reviewed.   Constitutional:       Appearance: Normal appearance.   Eyes:      " Extraocular Movements: Extraocular movements intact.   Neck:      Comments: No goiter  Lymphadenopathy:      Cervical: No cervical adenopathy.   Neurological:      Mental Status: She is alert.   Psychiatric:         Mood and Affect: Mood normal.         Thought Content: Thought content normal.         Judgment: Judgment normal.         Assessment / Plan      Assessment & Plan:  Problem List Items Addressed This Visit        Other    Toxic multinodul goiter - Primary    Current Assessment & Plan     Clinically euthyroid. Thyroid levels ordered. Medication to be adjusted accordingly.           Relevant Orders    T4, Free    TSH           Joaquim Lee MD   05/02/2022

## 2022-05-03 LAB
T4 FREE SERPL-MCNC: 1.04 NG/DL (ref 0.93–1.7)
TSH SERPL DL<=0.05 MIU/L-ACNC: 1.84 UIU/ML (ref 0.27–4.2)

## 2022-05-05 ENCOUNTER — HOSPITAL ENCOUNTER (OUTPATIENT)
Dept: MAMMOGRAPHY | Facility: HOSPITAL | Age: 67
Discharge: HOME | End: 2022-05-05
Admitting: FAMILY MEDICINE

## 2022-05-05 DIAGNOSIS — Z12.31 VISIT FOR SCREENING MAMMOGRAM: ICD-10-CM

## 2022-05-05 PROCEDURE — 77067 SCR MAMMO BI INCL CAD: CPT | Performed by: RADIOLOGY

## 2022-05-05 PROCEDURE — 77063 BREAST TOMOSYNTHESIS BI: CPT | Performed by: RADIOLOGY

## 2022-05-05 PROCEDURE — 77063 BREAST TOMOSYNTHESIS BI: CPT

## 2022-05-05 PROCEDURE — 77067 SCR MAMMO BI INCL CAD: CPT

## 2022-10-24 ENCOUNTER — TELEPHONE (OUTPATIENT)
Dept: ENDOCRINOLOGY | Facility: CLINIC | Age: 67
End: 2022-10-24

## 2022-10-24 DIAGNOSIS — E05.20 TOXIC MULTINODUL GOITER: Primary | ICD-10-CM

## 2022-10-24 NOTE — TELEPHONE ENCOUNTER
A YOUNG RICHARDS/ [facility] CALLED WANTING TO KNOW WHAT LABS THIS PT NEEDS PRIOR TO THEIR APPT WITH US. YOUNG CAN BE REACHED @ PHONE # [phone] EXT [phone]    PT IS IN THEIR OFFICE NOW.

## 2022-10-24 NOTE — TELEPHONE ENCOUNTER
TANIYA Blair at [facility] she will have pt. Come to our office on a Thursday or Friday for labs. Lab orders added. BENSON

## 2022-10-27 ENCOUNTER — LAB (OUTPATIENT)
Dept: ENDOCRINOLOGY | Facility: CLINIC | Age: 67
End: 2022-10-27

## 2022-10-27 ENCOUNTER — LAB (OUTPATIENT)
Dept: LAB | Facility: HOSPITAL | Age: 67
End: 2022-10-27

## 2022-10-27 DIAGNOSIS — E05.20 TOXIC MULTINODUL GOITER: ICD-10-CM

## 2022-10-27 PROCEDURE — 84439 ASSAY OF FREE THYROXINE: CPT

## 2022-10-27 PROCEDURE — 84443 ASSAY THYROID STIM HORMONE: CPT

## 2022-10-28 LAB
T4 FREE SERPL-MCNC: 1.17 NG/DL (ref 0.93–1.7)
TSH SERPL DL<=0.05 MIU/L-ACNC: 0.55 UIU/ML (ref 0.27–4.2)

## 2022-11-03 ENCOUNTER — OFFICE VISIT (OUTPATIENT)
Dept: ENDOCRINOLOGY | Facility: CLINIC | Age: 67
End: 2022-11-03

## 2022-11-03 VITALS
HEIGHT: 64 IN | SYSTOLIC BLOOD PRESSURE: 116 MMHG | OXYGEN SATURATION: 97 % | WEIGHT: 170 LBS | BODY MASS INDEX: 29.02 KG/M2 | DIASTOLIC BLOOD PRESSURE: 74 MMHG | HEART RATE: 82 BPM

## 2022-11-03 DIAGNOSIS — E05.20 TOXIC MULTINODUL GOITER: Primary | ICD-10-CM

## 2022-11-03 PROCEDURE — 99212 OFFICE O/P EST SF 10 MIN: CPT | Performed by: INTERNAL MEDICINE

## 2022-11-03 NOTE — ASSESSMENT & PLAN NOTE
Recent tft's normal off of meds. This has resolved. No meds needed. Routine follow up with pcp. I will be glad to see prn

## 2022-11-03 NOTE — PROGRESS NOTES
"     Office Note      Date: 2022  Patient Name: Toby Hoffman  MRN: [id number]  : 1955    Chief Complaint   Patient presents with   • Goiter       History of Present Illness:   Toby Hoffman is a 67 y.o. female who presents for Goiter   and hyperthyroidism. Last time I stopped her methimazole due to high tsh  She is still tired but has not gained weight     Subjective          Review of Systems:   Review of Systems   Constitutional: Positive for fatigue.       The following portions of the patient's history were reviewed and updated as appropriate: allergies, current medications, past family history, past medical history, past social history, past surgical history and problem list.    Objective     Visit Vitals  /74 (BP Location: Left arm, Patient Position: Sitting, Cuff Size: Adult)   Pulse 82   Ht 162.6 cm (64\")   Wt 77.1 kg (170 lb)   SpO2 97%   BMI 29.18 kg/m²       Labs:    CBC w/DIFF  Lab Results   Component Value Date    WBC 7.19 2018    RBC 3.85 (L) 2018    HGB 11.1 (L) 2018    HCT 33.4 (L) 2018    MCV 86.8 2018    MCH 28.8 2018    MCHC 33.2 2018    RDW 13.2 2018    RDWSD 41.9 2018    MPV 9.8 2018     2018    NEUTRORELPCT 60.0 2018    LYMPHORELPCT 25.6 2018    MONORELPCT 8.3 2018    EOSRELPCT 5.7 (H) 2018    BASORELPCT 0.4 2018    AUTOIGPER 0.1 2018    NEUTROABS 4.31 2018    LYMPHSABS 1.84 2018    MONOSABS 0.60 2018    EOSABS 0.41 (H) 2018    BASOSABS 0.03 2018    AUTOIGNUM 0.01 2018       T4  Free T4   Date Value Ref Range Status   10/27/2022 1.17 0.93 - 1.70 ng/dL Final       TSH  No results found for: TSHBASE     Physical Exam:  Physical Exam  Vitals reviewed.   Constitutional:       Appearance: Normal appearance.   HENT:      Head: Normocephalic and atraumatic.   Eyes:      Extraocular Movements: Extraocular movements intact.   Neck:      " Comments: Small, irregular thyroid   Lymphadenopathy:      Cervical: No cervical adenopathy.   Neurological:      Mental Status: She is alert.   Psychiatric:         Mood and Affect: Mood normal.         Judgment: Judgment normal.         Assessment / Plan      Assessment & Plan:  Problem List Items Addressed This Visit        Other    Toxic multinodul goiter - Primary    Current Assessment & Plan     Recent tft's normal off of meds. This has resolved. No meds needed. Routine follow up with pcp. I will be glad to see nydia Lee MD   11/03/2022

## 2023-06-07 ENCOUNTER — OFFICE VISIT (OUTPATIENT)
Dept: ENDOCRINOLOGY | Facility: CLINIC | Age: 68
End: 2023-06-07
Payer: MEDICARE

## 2023-06-07 VITALS
SYSTOLIC BLOOD PRESSURE: 124 MMHG | BODY MASS INDEX: 28.68 KG/M2 | HEART RATE: 64 BPM | HEIGHT: 64 IN | WEIGHT: 168 LBS | DIASTOLIC BLOOD PRESSURE: 70 MMHG

## 2023-06-07 DIAGNOSIS — E05.20 TOXIC MULTINODUL GOITER: Primary | ICD-10-CM

## 2023-06-07 PROCEDURE — 1160F RVW MEDS BY RX/DR IN RCRD: CPT | Performed by: INTERNAL MEDICINE

## 2023-06-07 PROCEDURE — 1159F MED LIST DOCD IN RCRD: CPT | Performed by: INTERNAL MEDICINE

## 2023-06-07 PROCEDURE — 3074F SYST BP LT 130 MM HG: CPT | Performed by: INTERNAL MEDICINE

## 2023-06-07 PROCEDURE — 99212 OFFICE O/P EST SF 10 MIN: CPT | Performed by: INTERNAL MEDICINE

## 2023-06-07 PROCEDURE — 3078F DIAST BP <80 MM HG: CPT | Performed by: INTERNAL MEDICINE

## 2023-06-07 RX ORDER — CLONAZEPAM 1 MG/1
1 TABLET ORAL DAILY
COMMUNITY

## 2023-06-07 RX ORDER — MIRTAZAPINE 15 MG/1
15 TABLET, ORALLY DISINTEGRATING ORAL NIGHTLY
COMMUNITY

## 2023-06-07 NOTE — ASSESSMENT & PLAN NOTE
I need the labs from dr belcher's office before I can know what to do.   I suspect that the tsh is low again but to be certain , I need the labs. We called again but had to let the patient leave after an hour as they had not arrived. We will call pt with a plan once I get the results

## 2023-06-07 NOTE — PROGRESS NOTES
"     Office Note      Date: 2023  Patient Name: Toby Hoffman  MRN: [id number]  : 1955    Cc: thyroid follow up     History of Present Illness:   Toby Hoffman is a 68 y.o. female who presents for Thyroid follow up .   Current rx:none     Changes in history: was seen by pcp about 3 weeks ago and was told her thyroid was low. We don't have those results. We called for them but they have not bee sent   Questions /problems:notes fatigue and headache     Subjective          Review of Systems:   Review of Systems   Constitutional:  Positive for fatigue.   Cardiovascular:  Negative for palpitations.   Endocrine: Negative for cold intolerance and heat intolerance.   Neurological:  Positive for headaches. Negative for tremors.   Psychiatric/Behavioral:  Positive for sleep disturbance.      The following portions of the patient's history were reviewed and updated as appropriate: allergies, current medications, past family history, past medical history, past social history, past surgical history, and problem list.    Objective     Visit Vitals  /70 (BP Location: Left arm, Patient Position: Sitting, Cuff Size: Adult)   Pulse 64   Ht 162.6 cm (64.02\")   Wt 76.2 kg (168 lb)   BMI 28.82 kg/m²           Physical Exam:  Physical Exam  Vitals reviewed.   Constitutional:       General: She is not in acute distress.     Appearance: She is normal weight. She is not ill-appearing, toxic-appearing or diaphoretic.   Neurological:      Mental Status: She is alert.   Psychiatric:         Mood and Affect: Mood normal.         Thought Content: Thought content normal.         Judgment: Judgment normal.       Assessment / Plan      Assessment & Plan:  Problem List Items Addressed This Visit          Other    Toxic multinodul goiter - Primary    Overview     Stopped methimazole in 2022         Current Assessment & Plan     I need the labs from dr belcher's office before I can know what to do.   I suspect that the tsh is low " again but to be certain , I need the labs. We called again but had to let the patient leave after an hour as they had not arrived. We will call pt with a plan once I get the results              Joaquim Lee MD   06/07/2023

## 2023-06-08 ENCOUNTER — TELEPHONE (OUTPATIENT)
Dept: ENDOCRINOLOGY | Facility: CLINIC | Age: 68
End: 2023-06-08
Payer: MEDICARE

## 2023-06-08 RX ORDER — METHIMAZOLE 5 MG/1
5 TABLET ORAL DAILY
Qty: 30 TABLET | Refills: 3 | Status: SHIPPED | OUTPATIENT
Start: 2023-06-08

## 2023-06-08 NOTE — TELEPHONE ENCOUNTER
amanda let her / family know that this means her thyroid is overactive again. needs to go back on methimazole 5 mg daily. this is why she is so tired. follow up 3 months with me or pcp     Spoke to pts son - he voiced understanding

## 2023-08-11 RX ORDER — METHIMAZOLE 5 MG/1
5 TABLET ORAL DAILY
Qty: 30 TABLET | Refills: 3 | Status: SHIPPED | OUTPATIENT
Start: 2023-08-11

## 2023-09-27 ENCOUNTER — OFFICE VISIT (OUTPATIENT)
Dept: ENDOCRINOLOGY | Facility: CLINIC | Age: 68
End: 2023-09-27
Payer: MEDICARE

## 2023-09-27 VITALS
WEIGHT: 172 LBS | SYSTOLIC BLOOD PRESSURE: 118 MMHG | HEART RATE: 86 BPM | DIASTOLIC BLOOD PRESSURE: 78 MMHG | BODY MASS INDEX: 29.37 KG/M2 | HEIGHT: 64 IN | OXYGEN SATURATION: 95 %

## 2023-09-27 DIAGNOSIS — E05.20 TOXIC MULTINODUL GOITER: Primary | ICD-10-CM

## 2023-09-27 PROCEDURE — 1159F MED LIST DOCD IN RCRD: CPT | Performed by: INTERNAL MEDICINE

## 2023-09-27 PROCEDURE — 3078F DIAST BP <80 MM HG: CPT | Performed by: INTERNAL MEDICINE

## 2023-09-27 PROCEDURE — 1160F RVW MEDS BY RX/DR IN RCRD: CPT | Performed by: INTERNAL MEDICINE

## 2023-09-27 PROCEDURE — 3074F SYST BP LT 130 MM HG: CPT | Performed by: INTERNAL MEDICINE

## 2023-09-27 PROCEDURE — 99213 OFFICE O/P EST LOW 20 MIN: CPT | Performed by: INTERNAL MEDICINE

## 2023-09-27 NOTE — ASSESSMENT & PLAN NOTE
Deteriorated as we had to resume treatment  It's possible that her vision changes could be TR.  She had labs done last week with pcp and we called to get them but that office is closed. We will get those tomorrow  The plan will be to normalize her thyroid levels and if double vision persists then she will need to see eye doc

## 2023-09-27 NOTE — PROGRESS NOTES
"     Office Note      Date: 2023  Patient Name: Toby Hoffman  MRN: [id number]  : 1955    Cc: thyroid issue   .   Current rx:methimzole 5 mg per day    Hpi- pt is 67 yo female with hx of toxic mng. In  we resumes methimole 5 mg per day because of suppressed tsh.   In past 10 mg per day was too much.  She notes double vision     Changes in history:none   Questions /problems: see ej     Subjective          Review of Systems:   Review of Systems   Constitutional:  Positive for fatigue. Negative for unexpected weight change.   HENT:  Negative for trouble swallowing and voice change.    Eyes:  Negative for visual disturbance.   Cardiovascular:  Negative for palpitations.   Endocrine: Negative for cold intolerance and heat intolerance.   Neurological:  Positive for tremors.     The following portions of the patient's history were reviewed and updated as appropriate: allergies, current medications, past family history, past medical history, past social history, past surgical history, and problem list.    Objective     Visit Vitals  /78 (BP Location: Left arm, Patient Position: Sitting, Cuff Size: Adult)   Pulse 86   Ht 162.6 cm (64\")   Wt 78 kg (172 lb)   SpO2 95%   BMI 29.52 kg/m²           Physical Exam:  Physical Exam  Eyes:      Extraocular Movements: Extraocular movements intact.   Neck:      Comments: Irregular thyroid   Lymphadenopathy:      Cervical: No cervical adenopathy.   Neurological:      Mental Status: She is alert.   Psychiatric:         Mood and Affect: Mood normal.         Thought Content: Thought content normal.         Judgment: Judgment normal.     Assessment / Plan      Assessment & Plan:  Problem List Items Addressed This Visit          Other    Toxic multinodul goiter - Primary    Overview     Stopped methimazole in 2022  Resumed summer 2023         Current Assessment & Plan     Deteriorated as we had to resume treatment  It's possible that her vision changes could be " TR.  She had labs done last week with pcp and we called to get them but that office is closed. We will get those tomorrow  The plan will be to normalize her thyroid levels and if double vision persists then she will need to see eye doc         Relevant Medications    methIMAzole (TAPAZOLE) 5 MG tablet        Joaquim Lee MD   09/27/2023

## 2023-09-28 ENCOUNTER — TELEPHONE (OUTPATIENT)
Dept: ENDOCRINOLOGY | Facility: CLINIC | Age: 68
End: 2023-09-28
Payer: MEDICARE

## 2023-09-28 RX ORDER — METHIMAZOLE 10 MG/1
10 TABLET ORAL DAILY
Qty: 90 TABLET | Refills: 1 | Status: SHIPPED | OUTPATIENT
Start: 2023-09-28

## 2023-09-28 NOTE — TELEPHONE ENCOUNTER
Spoke with patient.  Gave verbal to speak with daughter.  Daughter notified of lab results.  She is asking for rx to be sent to [facility].

## 2023-09-28 NOTE — TELEPHONE ENCOUNTER
PT'S DAUGHTER IN LAW CALLED STATING THIS PT RECEIVED A CALL FROM US TODAY. SHE REQUESTED A CALL BACK .

## 2023-10-04 ENCOUNTER — TELEPHONE (OUTPATIENT)
Dept: ENDOCRINOLOGY | Facility: CLINIC | Age: 68
End: 2023-10-04
Payer: MEDICARE

## 2023-10-04 NOTE — TELEPHONE ENCOUNTER
Patient and patients daughter in law notified and verbalized understanding.  She states that [place] and wants to have a prescription sent for 5 months for the 5mg to her pharmacy.

## 2023-10-04 NOTE — TELEPHONE ENCOUNTER
Pt called primary doctor Dr Hayden Curry office pt is taking methimazole 10 mg as of 09/27/23. Medication is giving pt really bad headaches and pt feels disoriented. Pt stated she was taking Methimazole 5 mg pt done well. Pt last ov 09/27/23 pt has no f/u scheduled

## 2023-10-04 NOTE — TELEPHONE ENCOUNTER
On 5 mg per day, she was hyperthyroid. It was not enough medication. Obviously, I don't want to make her feel poorly with medication but if she cannot tolerate enough medication to control her thyroid then we need another treatment option- the next step would be to have her thyroid removed./ she can go bck to 5 mg per day and see how she does for 3 months

## 2023-10-06 RX ORDER — METHIMAZOLE 5 MG/1
5 TABLET ORAL DAILY
Qty: 150 TABLET | Refills: 0 | Status: SHIPPED | OUTPATIENT
Start: 2023-10-06 | End: 2024-10-05

## 2023-10-06 RX ORDER — METHIMAZOLE 10 MG/1
10 TABLET ORAL DAILY
Qty: 90 TABLET | Refills: 1 | Status: SHIPPED | OUTPATIENT
Start: 2023-10-06 | End: 2023-10-06

## 2023-10-06 NOTE — TELEPHONE ENCOUNTER
Spoke to pt-10mg methimazole sent to pharmacy.  Per lcm she can take 5mg daily for 3 months and we can see how she feel.

## 2024-04-29 ENCOUNTER — TELEPHONE (OUTPATIENT)
Dept: ENDOCRINOLOGY | Facility: CLINIC | Age: 69
End: 2024-04-29
Payer: MEDICARE

## 2024-04-29 NOTE — TELEPHONE ENCOUNTER
YOUNG WITH [facility] IS CALLING WITH QUESTIONS ABOUT PATIENTS METHIMAZOLE PRESCRIPTION. PATIENT STOPPED TAKING THIS MEDICATION FOR ABOUT 3 MONTHS. PATIENT STOPPED TAKING THIS ON HER OWN. THEY DID BLOOD WORK RECENTLY AND THE TSH LEVEL WAS 1..8 WHICH WAS DRAWN ON 3/27/24. THEY NEED TO SEE WHAT DR. GLORIA RECOMMENDS IF PATIENT NEEDS TO START TAKING THIS MEDICATION AGAIN OR CONTINUE NOT TAKING IT. PHONE NUMBER [phone]

## 2024-04-30 NOTE — TELEPHONE ENCOUNTER
Her thyroid levels are normal. If she has indeed been off of methimazole for 3 months, then she does not need to resume it. Her levels ought to be rechecked in 3 months. She can do that through her pcp.

## 2025-01-10 ENCOUNTER — TRANSCRIBE ORDERS (OUTPATIENT)
Dept: ADMINISTRATIVE | Facility: HOSPITAL | Age: 70
End: 2025-01-10
Payer: MEDICARE

## 2025-01-10 DIAGNOSIS — M81.0 AGE-RELATED OSTEOPOROSIS WITHOUT CURRENT PATHOLOGICAL FRACTURE: ICD-10-CM

## 2025-01-10 DIAGNOSIS — Z12.31 VISIT FOR SCREENING MAMMOGRAM: Primary | ICD-10-CM

## 2025-01-24 ENCOUNTER — OFFICE VISIT (OUTPATIENT)
Dept: ENDOCRINOLOGY | Facility: CLINIC | Age: 70
End: 2025-01-24
Payer: MEDICARE

## 2025-01-24 VITALS
OXYGEN SATURATION: 97 % | SYSTOLIC BLOOD PRESSURE: 114 MMHG | HEART RATE: 82 BPM | HEIGHT: 64 IN | BODY MASS INDEX: 29.51 KG/M2 | DIASTOLIC BLOOD PRESSURE: 78 MMHG

## 2025-01-24 DIAGNOSIS — E05.20 TOXIC MULTINODUL GOITER: Primary | ICD-10-CM

## 2025-01-24 LAB
T4 FREE SERPL-MCNC: 0.73 NG/DL (ref 0.92–1.68)
TSH SERPL DL<=0.05 MIU/L-ACNC: 7.25 UIU/ML (ref 0.27–4.2)

## 2025-01-24 PROCEDURE — 84443 ASSAY THYROID STIM HORMONE: CPT | Performed by: PHYSICIAN ASSISTANT

## 2025-01-24 PROCEDURE — 84445 ASSAY OF TSI GLOBULIN: CPT | Performed by: PHYSICIAN ASSISTANT

## 2025-01-24 PROCEDURE — 84439 ASSAY OF FREE THYROXINE: CPT | Performed by: PHYSICIAN ASSISTANT

## 2025-01-24 PROCEDURE — 80053 COMPREHEN METABOLIC PANEL: CPT | Performed by: PHYSICIAN ASSISTANT

## 2025-01-24 NOTE — ASSESSMENT & PLAN NOTE
Initial labs 3/24/2021 TSH: 0.011, FT4: 1.27, TPO ab: undetectable, Thyroglobulin ab: undetectable  Thyroid ultrasound 4/2021 with right hyperechoic area 1.2 x 1.1 x 1.2 cm, left hyperechoic area 0.9 x 0.8 x 0.8 cm.     Right >left thyroid enlargement on exam.  Repeat TFT; discussed testing TSI for possible thyroid eye disease - would like today.  Rx: continue current dose methimazole 10 mg for now  Discussed alternative treatments as needed: medication, ablation, surgery  Consider repeat thyroid ultrasound/uptake scan.   Advised further evaluation with opthalmology if eye symptoms persist.

## 2025-01-24 NOTE — PROGRESS NOTES
Chief Complaint   Patient presents with    Toxic multinodul goiter      HPI  Toby Hoffman is a 70 y.o. female presents today for evaluation of hyperthyroidism/toxic multinodular goiter. She was last seen for this 9/27/2023 by another provider in clinic.     Reports since last visit has increased methimazole 10 mg. Taking as prescribed and tolerating well.   Has not had repeat labs.     Without additional concerns today.   - Admits intermittent itchy, watery eyes   - Denies neck swelling, difficulty swallowing, hoarseness  - Denies changes in weight, fatigue, weakness, heat/cold intolerance, chest pain, palpitations, tremor, abdominal pain, diarrhea, constipation, insomnia, anxiety/depression, vision changes, changes in hair/skin/nails.     Hyperthyroidism/Low TSH:  - Initially diagnosed with abnormal thyroid labs 2021.    Labs from  7/2/2024 TSH: undetectable, ALP: 150, A1C 6.3%  3/26/2024 TSH: 1.8   3/24/2021 TSH: 0.011, FT4: 1.27, TPO ab: undetectable, Thyroglobulin ab: undetectable    Thyroid ultrasound 4/2021 with right hyperechoic area 1.2 x 1.1 x 1.2 cm, left hyperechoic area 0.9 x 0.8 x 0.8 cm     - Admits family history of thyroid disease: sister (hyperthyroidism)    - Denies history of radiation to head/neck  - Denies biotin supplement  Tobacco use: Denies    Past Medical History:   Diagnosis Date    Anxiety     Hyperlipidemia     Hypertension      History reviewed. No pertinent surgical history.   Family History   Problem Relation Age of Onset    Heart attack Father     Breast cancer Neg Hx     Ovarian cancer Neg Hx       Social History     Socioeconomic History    Marital status:    Tobacco Use    Smoking status: Never    Smokeless tobacco: Never   Vaping Use    Vaping status: Never Used   Substance and Sexual Activity    Alcohol use: No    Drug use: No    Sexual activity: Defer      No Known Allergies   Current Outpatient Medications on File Prior to Visit   Medication Sig Dispense Refill     "ASPIRIN 81 PO Take 81 mg by mouth As Needed.      atorvastatin (LIPITOR) 10 MG tablet Take 1 tablet by mouth Every Night.      CALCIUM PO Take 1 tablet by mouth Daily.      clonazePAM (KlonoPIN) 1 MG tablet Take 1 tablet by mouth Daily. TAKES 1/2 TABLET      lisinopril (PRINIVIL,ZESTRIL) 10 MG tablet Take 1 tablet by mouth Daily.      methIMAzole (TAPAZOLE) 5 MG tablet Take 1 tablet by mouth Daily. [place] (Patient taking differently: Take 2 tablets by mouth Daily. [place] the country [place]5 month supply requested) 150 tablet 0    mirtazapine (REMERON SOL-TAB) 15 MG disintegrating tablet Place 1 tablet on the tongue Every Night.       No current facility-administered medications on file prior to visit.        The following portions of the patient's history were reviewed and updated as appropriate: allergies, current medications, past family history, past medical history, past social history, past surgical history and problem list.    Review of Systems   Constitutional:  Positive for fatigue. Negative for activity change, appetite change, unexpected weight gain and unexpected weight loss.   HENT:  Negative for trouble swallowing and voice change.    Eyes:  Positive for itching.   Respiratory:  Negative for shortness of breath.    Cardiovascular:  Negative for chest pain and palpitations.   Gastrointestinal:  Negative for abdominal pain, constipation and diarrhea.   Endocrine: Negative for cold intolerance and heat intolerance.   Musculoskeletal:  Negative for myalgias.   Skin:  Negative for dry skin.   Neurological:  Negative for tremors and numbness.   Psychiatric/Behavioral:  Negative for depressed mood. The patient is not nervous/anxious.         /78   Pulse 82   Ht 162.6 cm (64.02\")   SpO2 97%   BMI 29.51 kg/m² Body mass index is 29.51 kg/m².    Physical Exam  Constitutional:       Appearance: Normal appearance.   Eyes:      Extraocular Movements: " Extraocular movements intact.   Neck:      Thyroid: Thyromegaly (mild right >left) present. No thyroid tenderness.   Cardiovascular:      Rate and Rhythm: Normal rate.   Pulmonary:      Effort: Pulmonary effort is normal.   Musculoskeletal:         General: Normal range of motion.   Skin:     General: Skin is warm and dry.   Neurological:      General: No focal deficit present.      Mental Status: She is alert and oriented to person, place, and time.   Psychiatric:         Mood and Affect: Mood normal.         Behavior: Behavior normal.         LABS AND IMAGING    TSH  Lab Results   Component Value Date    TSH 0.546 10/27/2022    TSH 1.840 05/02/2022    TSH 3.617 11/29/2018       T4  Lab Results   Component Value Date    FREET4 1.17 10/27/2022    FREET4 1.04 05/02/2022    FREET4 1.08 11/29/2018       No valid procedures specified.    Assessment and Plan    Diagnoses and all orders for this visit:    1. Toxic multinodul goiter (Primary)  Assessment & Plan:  Initial labs 3/24/2021 TSH: 0.011, FT4: 1.27, TPO ab: undetectable, Thyroglobulin ab: undetectable  Thyroid ultrasound 4/2021 with right hyperechoic area 1.2 x 1.1 x 1.2 cm, left hyperechoic area 0.9 x 0.8 x 0.8 cm.     Right >left thyroid enlargement on exam.  Repeat TFT; discussed testing TSI for possible thyroid eye disease - would like today.  Rx: continue current dose methimazole 10 mg for now  Discussed alternative treatments as needed: medication, ablation, surgery  Consider repeat thyroid ultrasound/uptake scan.   Advised further evaluation with opthalmology if eye symptoms persist.    Orders:  -     TSH  -     T4, Free  -     Thyroid Stimulating Immunoglobulin  -     Comprehensive Metabolic Panel         Return in about 3 months (around 4/24/2025) for follow-up thyroid disease. The patient was instructed to contact the clinic with any interval questions or concerns.    Electronically signed by: Marianna Pappas PA-C   Endocrinology     Please note that  portions of this note were completed with a voice recognition program.

## 2025-01-25 LAB
ALBUMIN SERPL-MCNC: 4.1 G/DL (ref 3.5–5.2)
ALBUMIN/GLOB SERPL: 1.1 G/DL
ALP SERPL-CCNC: 159 U/L (ref 39–117)
ALT SERPL W P-5'-P-CCNC: 19 U/L (ref 1–33)
ANION GAP SERPL CALCULATED.3IONS-SCNC: 11 MMOL/L (ref 5–15)
AST SERPL-CCNC: 24 U/L (ref 1–32)
BILIRUB SERPL-MCNC: 0.3 MG/DL (ref 0–1.2)
BUN SERPL-MCNC: 18 MG/DL (ref 8–23)
BUN/CREAT SERPL: 20.2 (ref 7–25)
CALCIUM SPEC-SCNC: 9.3 MG/DL (ref 8.6–10.5)
CHLORIDE SERPL-SCNC: 98 MMOL/L (ref 98–107)
CO2 SERPL-SCNC: 24 MMOL/L (ref 22–29)
CREAT SERPL-MCNC: 0.89 MG/DL (ref 0.57–1)
EGFRCR SERPLBLD CKD-EPI 2021: 69 ML/MIN/1.73
GLOBULIN UR ELPH-MCNC: 3.6 GM/DL
GLUCOSE SERPL-MCNC: 81 MG/DL (ref 65–99)
POTASSIUM SERPL-SCNC: 4.7 MMOL/L (ref 3.5–5.2)
PROT SERPL-MCNC: 7.7 G/DL (ref 6–8.5)
SODIUM SERPL-SCNC: 133 MMOL/L (ref 136–145)

## 2025-01-27 LAB — TSI SER-ACNC: 0.86 IU/L (ref 0–0.55)

## 2025-01-28 ENCOUNTER — TELEPHONE (OUTPATIENT)
Dept: ENDOCRINOLOGY | Facility: CLINIC | Age: 70
End: 2025-01-28
Payer: MEDICARE

## 2025-01-28 DIAGNOSIS — E05.20 TOXIC MULTINODUL GOITER: Primary | ICD-10-CM

## 2025-01-28 DIAGNOSIS — R73.03 PREDIABETES: ICD-10-CM

## 2025-01-28 RX ORDER — METHIMAZOLE 5 MG/1
5 TABLET ORAL DAILY
Qty: 90 TABLET | Refills: 0 | Status: SHIPPED | OUTPATIENT
Start: 2025-01-28 | End: 2026-01-28

## 2025-01-28 NOTE — TELEPHONE ENCOUNTER
Spoke with patient and patient's daughter in law who assists with translation in regard to recent labs with elevated TSH suggesting too much methimazole. Advised decrease methimazole 5 mg and repeat labs in 6 weeks for monitoring. Also requesting repeat A1C. Future labs placed and can have done walk-in on Thursdays or Fridays at the [place] office.   Agreeable to plan.

## 2025-05-02 ENCOUNTER — OFFICE VISIT (OUTPATIENT)
Dept: ENDOCRINOLOGY | Facility: CLINIC | Age: 70
End: 2025-05-02
Payer: MEDICARE

## 2025-05-02 VITALS
WEIGHT: 179 LBS | SYSTOLIC BLOOD PRESSURE: 122 MMHG | DIASTOLIC BLOOD PRESSURE: 76 MMHG | BODY MASS INDEX: 30.56 KG/M2 | HEART RATE: 75 BPM | OXYGEN SATURATION: 97 % | HEIGHT: 64 IN

## 2025-05-02 DIAGNOSIS — E05.90 HYPERTHYROIDISM: Primary | ICD-10-CM

## 2025-05-02 DIAGNOSIS — R73.03 PREDIABETES: ICD-10-CM

## 2025-05-02 LAB
T4 FREE SERPL-MCNC: 0.78 NG/DL (ref 0.92–1.68)
TSH SERPL DL<=0.05 MIU/L-ACNC: 6.49 UIU/ML (ref 0.27–4.2)

## 2025-05-02 PROCEDURE — 84443 ASSAY THYROID STIM HORMONE: CPT | Performed by: PHYSICIAN ASSISTANT

## 2025-05-02 PROCEDURE — 80053 COMPREHEN METABOLIC PANEL: CPT | Performed by: PHYSICIAN ASSISTANT

## 2025-05-02 PROCEDURE — 83036 HEMOGLOBIN GLYCOSYLATED A1C: CPT | Performed by: PHYSICIAN ASSISTANT

## 2025-05-02 PROCEDURE — 84439 ASSAY OF FREE THYROXINE: CPT | Performed by: PHYSICIAN ASSISTANT

## 2025-05-02 NOTE — PROGRESS NOTES
Chief Complaint   Patient presents with    Toxic multinodul goiter        HPI  Toby Hoffman is a 70 y.o. female presents today for evaluation of hyperthyroidism.     Translation provided by language line .    Methimazole reduced to 5 mg after visit 1/2025.     Taking as prescribed and tolerating well     Without additional concerns today.   Intermittent itchy, watery eyes; improved    - Denies neck swelling, difficulty swallowing, hoarseness  - Denies changes in weight, fatigue, weakness, heat/cold intolerance, chest pain, palpitations, tremor, abdominal pain, diarrhea, constipation, insomnia, anxiety/depression, vision changes, changes in hair/skin/nails.      Hyperthyroidism:  - Initially diagnosed with abnormal thyroid labs 2021.     Labs from  1/24/2025 TSH: 7.2, FT4: 0.73, TSI: 0.86  7/2/2024 TSH: undetectable, ALP: 150, A1C 6.3%  3/26/2024 TSH: 1.8   3/24/2021 TSH: 0.011, FT4: 1.27, TPO ab: undetectable, Thyroglobulin ab: undetectable     Thyroid ultrasound 4/2021 with right hyperechoic area 1.2 x 1.1 x 1.2 cm, left hyperechoic area 0.9 x 0.8 x 0.8 cm      - Admits family history of thyroid disease: sister (hyperthyroidism)    - Denies history of radiation to head/neck  - Denies biotin supplement  Tobacco use: Denies    Prediabetes:  Labs from   7/2/2024 A1C 6.3%    Currently regimen includes: diet controlled    Meals: 2x/day; mostly whole foods Drinks: water   Physical activity: walking    The following portions of the patient's history were reviewed and updated as appropriate: allergies, current medications, past family history, past medical history, past social history, past surgical history and problem list.     Past Medical History:   Diagnosis Date    Anxiety     Hyperlipidemia     Hypertension      History reviewed. No pertinent surgical history.   Family History   Problem Relation Age of Onset    Heart attack Father     Breast cancer Neg Hx     Ovarian cancer Neg Hx       Social History  "    Socioeconomic History    Marital status:    Tobacco Use    Smoking status: Never    Smokeless tobacco: Never   Vaping Use    Vaping status: Never Used   Substance and Sexual Activity    Alcohol use: No    Drug use: No    Sexual activity: Defer      No Known Allergies   Current Outpatient Medications on File Prior to Visit   Medication Sig Dispense Refill    ASPIRIN 81 PO Take 81 mg by mouth As Needed.      atorvastatin (LIPITOR) 10 MG tablet Take 1 tablet by mouth Every Night.      CALCIUM PO Take 1 tablet by mouth Daily.      clonazePAM (KlonoPIN) 1 MG tablet Take 1 tablet by mouth Daily. TAKES 1/2 TABLET      lisinopril (PRINIVIL,ZESTRIL) 10 MG tablet Take 1 tablet by mouth Daily.      methIMAzole (TAPAZOLE) 5 MG tablet Take 1 tablet by mouth Daily. 90 tablet 0    mirtazapine (REMERON SOL-TAB) 15 MG disintegrating tablet Place 1 tablet on the tongue Every Night.       No current facility-administered medications on file prior to visit.        Review of Systems   Constitutional:  Negative for activity change, appetite change, fatigue, unexpected weight gain and unexpected weight loss.   HENT:  Negative for swollen glands, trouble swallowing and voice change.    Eyes:  Negative for discharge and itching.   Respiratory:  Negative for shortness of breath.    Cardiovascular:  Negative for chest pain, palpitations and leg swelling.   Gastrointestinal:  Negative for abdominal pain, constipation and diarrhea.   Endocrine: Negative for cold intolerance and heat intolerance.   Musculoskeletal:  Negative for myalgias.   Skin:  Negative for dry skin.   Neurological:  Negative for tremors and numbness.   Psychiatric/Behavioral:  Negative for depressed mood. The patient is not nervous/anxious.         /76   Pulse 75   Ht 162.6 cm (64.02\")   Wt 81.2 kg (179 lb)   SpO2 97%   BMI 30.71 kg/m² Body mass index is 30.71 kg/m².    Physical Exam  Constitutional:       Appearance: Normal appearance. " "  Cardiovascular:      Rate and Rhythm: Normal rate.   Pulmonary:      Effort: Pulmonary effort is normal.   Musculoskeletal:         General: Normal range of motion.   Skin:     General: Skin is warm and dry.   Neurological:      General: No focal deficit present.      Mental Status: She is alert and oriented to person, place, and time.   Psychiatric:         Mood and Affect: Mood normal.         Behavior: Behavior normal.         LABS AND IMAGING    CMP  Lab Results   Component Value Date    GLUCOSE 81 01/24/2025    BUN 18 01/24/2025    CREATININE 0.89 01/24/2025    EGFR 69.0 01/24/2025    BCR 20.2 01/24/2025    K 4.7 01/24/2025    CO2 24.0 01/24/2025    CALCIUM 9.3 01/24/2025    ALBUMIN 4.1 01/24/2025    AST 24 01/24/2025    ALT 19 01/24/2025        CBC w/DIFF   Lab Results   Component Value Date    WBC 7.19 12/03/2018    RBC 3.85 (L) 12/03/2018    HGB 11.1 (L) 12/03/2018    HCT 33.4 (L) 12/03/2018    MCV 86.8 12/03/2018    MCH 28.8 12/03/2018    MCHC 33.2 12/03/2018    RDW 13.2 12/03/2018    RDWSD 41.9 12/03/2018    MPV 9.8 12/03/2018     12/03/2018    NEUTRORELPCT 60.0 12/03/2018    LYMPHORELPCT 25.6 12/03/2018    MONORELPCT 8.3 12/03/2018    EOSRELPCT 5.7 (H) 12/03/2018    BASORELPCT 0.4 12/03/2018    AUTOIGPER 0.1 12/03/2018    NEUTROABS 4.31 12/03/2018    LYMPHSABS 1.84 12/03/2018    MONOSABS 0.60 12/03/2018    EOSABS 0.41 (H) 12/03/2018    BASOSABS 0.03 12/03/2018    AUTOIGNUM 0.01 12/03/2018       TSH  Lab Results   Component Value Date    TSH 7.250 (H) 01/24/2025    TSH 0.546 10/27/2022    TSH 1.840 05/02/2022       T4  Lab Results   Component Value Date    FREET4 0.73 (L) 01/24/2025    FREET4 1.17 10/27/2022    FREET4 1.04 05/02/2022     No results found for: \"E0SUQHH\"    T3  No results found for: \"T3FREE\"  No results found for: \"N5VKNOY\"    TRAb  No results found for: \"TSURCPAB\"    TPO  No results found for: \"THYROIDAB\"    No valid procedures specified.    Assessment and Plan    Diagnoses and all " orders for this visit:    1. Hyperthyroidism (Primary)  Assessment & Plan:  Initial labs 3/24/2021 TSH: 0.011, FT4: 1.27, TPO ab: undetectable, Thyroglobulin ab: undetectable  Thyroid ultrasound 4/2021 with right hyperechoic area 1.2 x 1.1 x 1.2 cm, left hyperechoic area 0.9 x 0.8 x 0.8 cm.   TSI: 0.86 1/24/2025    Possible Grave's vs toxic multinodular goiter.     Discussed potential for remission with medication  Repeat TFT  Rx: continue current dose methimazole 5 mg for now    Orders:  -     Comprehensive Metabolic Panel  -     TSH  -     T4, Free    2. Prediabetes  Assessment & Plan:  Diet controlled.  Discussed medication management; defers at this time  Repeat A1C for monitoring.   Discussed risk for progression of type 2 diabetes.     Orders:  -     Hemoglobin A1c         Return in about 6 weeks (around 6/13/2025) for follow-up thyroid disease. The patient was instructed to contact the clinic with any interval questions or concerns.    Electronically signed by: Marianna Pappas PA-C   Endocrinology     Please note that portions of this note were completed with a voice recognition program.

## 2025-05-02 NOTE — ASSESSMENT & PLAN NOTE
Diet controlled.  Discussed medication management; defers at this time  Repeat A1C for monitoring.   Discussed risk for progression of type 2 diabetes.

## 2025-05-02 NOTE — ASSESSMENT & PLAN NOTE
Initial labs 3/24/2021 TSH: 0.011, FT4: 1.27, TPO ab: undetectable, Thyroglobulin ab: undetectable  Thyroid ultrasound 4/2021 with right hyperechoic area 1.2 x 1.1 x 1.2 cm, left hyperechoic area 0.9 x 0.8 x 0.8 cm.   TSI: 0.86 1/24/2025    Possible Grave's vs toxic multinodular goiter.     Discussed potential for remission with medication  Repeat TFT  Rx: continue current dose methimazole 5 mg for now

## 2025-05-03 LAB
ALBUMIN SERPL-MCNC: 4.1 G/DL (ref 3.5–5.2)
ALBUMIN/GLOB SERPL: 1.1 G/DL
ALP SERPL-CCNC: 132 U/L (ref 39–117)
ALT SERPL W P-5'-P-CCNC: 16 U/L (ref 1–33)
ANION GAP SERPL CALCULATED.3IONS-SCNC: 11.1 MMOL/L (ref 5–15)
AST SERPL-CCNC: 20 U/L (ref 1–32)
BILIRUB SERPL-MCNC: 0.4 MG/DL (ref 0–1.2)
BUN SERPL-MCNC: 14 MG/DL (ref 8–23)
BUN/CREAT SERPL: 17.1 (ref 7–25)
CALCIUM SPEC-SCNC: 9.4 MG/DL (ref 8.6–10.5)
CHLORIDE SERPL-SCNC: 101 MMOL/L (ref 98–107)
CO2 SERPL-SCNC: 24.9 MMOL/L (ref 22–29)
CREAT SERPL-MCNC: 0.82 MG/DL (ref 0.57–1)
EGFRCR SERPLBLD CKD-EPI 2021: 76.1 ML/MIN/1.73
GLOBULIN UR ELPH-MCNC: 3.8 GM/DL
GLUCOSE SERPL-MCNC: 97 MG/DL (ref 65–99)
HBA1C MFR BLD: 6.4 % (ref 4.8–5.6)
POTASSIUM SERPL-SCNC: 4.5 MMOL/L (ref 3.5–5.2)
PROT SERPL-MCNC: 7.9 G/DL (ref 6–8.5)
SODIUM SERPL-SCNC: 137 MMOL/L (ref 136–145)

## 2025-05-06 ENCOUNTER — TELEPHONE (OUTPATIENT)
Dept: ENDOCRINOLOGY | Facility: CLINIC | Age: 70
End: 2025-05-06
Payer: MEDICARE

## 2025-05-06 ENCOUNTER — RESULTS FOLLOW-UP (OUTPATIENT)
Dept: ENDOCRINOLOGY | Facility: CLINIC | Age: 70
End: 2025-05-06
Payer: MEDICARE

## 2025-05-06 DIAGNOSIS — E05.90 HYPERTHYROIDISM: Primary | ICD-10-CM

## 2025-05-06 NOTE — TELEPHONE ENCOUNTER
Spoke with patient daughter-in-law in regards to lab to assist with translation.  Continued elevated TSH.  Advised stopping methimazole for now and repeating labs in 2 to 3 weeks for continued monitoring.  Agreeable to walk-in labs at [facility].  Caution risk for return of hyperthyroidism especially heart racing, chest pain, anxiety, appetite loss.   
no

## 2025-06-17 ENCOUNTER — TELEPHONE (OUTPATIENT)
Dept: ENDOCRINOLOGY | Facility: CLINIC | Age: 70
End: 2025-06-17
Payer: MEDICARE

## 2025-06-17 NOTE — TELEPHONE ENCOUNTER
Called the son and told him we could not email orders. He requested they be mailed.    Orders printed and mailed.

## 2025-06-17 NOTE — TELEPHONE ENCOUNTER
Caller: Cory Hoffman    Relationship: Emergency Contact    Best call back number: [phone]    What was the call regarding: PT SON CALLED WANTING TO KNOW IF PT ORDERS CAN BE EMAILED TO HIM.   [email or web address] ADVISE.

## 2025-07-01 DIAGNOSIS — E05.90 HYPERTHYROIDISM: ICD-10-CM

## 2025-07-11 ENCOUNTER — OFFICE VISIT (OUTPATIENT)
Dept: ENDOCRINOLOGY | Facility: CLINIC | Age: 70
End: 2025-07-11
Payer: MEDICARE

## 2025-07-11 VITALS
OXYGEN SATURATION: 95 % | BODY MASS INDEX: 30.66 KG/M2 | WEIGHT: 179.6 LBS | SYSTOLIC BLOOD PRESSURE: 118 MMHG | HEIGHT: 64 IN | HEART RATE: 72 BPM | DIASTOLIC BLOOD PRESSURE: 70 MMHG

## 2025-07-11 DIAGNOSIS — R73.03 PREDIABETES: ICD-10-CM

## 2025-07-11 DIAGNOSIS — E05.90 HYPERTHYROIDISM: Primary | ICD-10-CM

## 2025-07-11 LAB
EXPIRATION DATE: NORMAL
GLUCOSE BLDC GLUCOMTR-MCNC: 108 MG/DL (ref 70–130)
Lab: NORMAL

## 2025-07-11 PROCEDURE — 3074F SYST BP LT 130 MM HG: CPT | Performed by: PHYSICIAN ASSISTANT

## 2025-07-11 PROCEDURE — 3078F DIAST BP <80 MM HG: CPT | Performed by: PHYSICIAN ASSISTANT

## 2025-07-11 PROCEDURE — 82947 ASSAY GLUCOSE BLOOD QUANT: CPT | Performed by: PHYSICIAN ASSISTANT

## 2025-07-11 PROCEDURE — 1160F RVW MEDS BY RX/DR IN RCRD: CPT | Performed by: PHYSICIAN ASSISTANT

## 2025-07-11 PROCEDURE — 99214 OFFICE O/P EST MOD 30 MIN: CPT | Performed by: PHYSICIAN ASSISTANT

## 2025-07-11 PROCEDURE — 1159F MED LIST DOCD IN RCRD: CPT | Performed by: PHYSICIAN ASSISTANT

## 2025-07-11 RX ORDER — METHIMAZOLE 5 MG/1
TABLET ORAL
Qty: 24 TABLET | Refills: 0 | Status: SHIPPED | OUTPATIENT
Start: 2025-07-11

## 2025-07-11 RX ORDER — METHIMAZOLE 5 MG/1
TABLET ORAL
Qty: 24 TABLET | Refills: 0 | Status: SHIPPED | OUTPATIENT
Start: 2025-07-11 | End: 2025-07-11

## 2025-07-11 NOTE — ASSESSMENT & PLAN NOTE
Mild elevated TSI suggestive of grave's vs toxic multinodular goiter.  Low-normal TSH on labs earlier this month.   Discussed may need continued low-dose methimazole given drop in TSH.   Rx: Restart methimazole 5 mg twice weekly Monday and Friday.  Cautioned symptoms hypothyroidism with restarting.   Advised repeat labs in 6 weeks for continued monitoring and adjustment.   Discussed stopping if return elevated TSH.

## 2025-07-11 NOTE — PROGRESS NOTES
Chief Complaint   Patient presents with    Hyperthyroidism      HPI  Toby Hoffman is a 70 y.o. female presents today for evaluation of hyperthyroidism, prediabetes. They were last seen for this 5/6/2025; stopped methimazole.     Translation provided by son a visit today.     Without additional concerns today.   - Admits intermittent fatigue; unchanged     - Denies itchy, watery eyes  - Denies neck swelling, difficulty swallowing, hoarseness  - Denies changes in weight, weakness, heat/cold intolerance, chest pain, palpitations, tremor, abdominal pain, diarrhea, constipation, insomnia, anxiety/depression, vision changes, changes in hair/skin/nails.     - Denies changes in diet/physical activity. Tries to limit refined CHO.   Meals: 2x/day; mostly whole foods; breads/rice as cho mostly Drinks: water   Physical activity: walking     Hyperthyroidism:  - Initially diagnosed with abnormal thyroid labs 2021.  - Admits family history of thyroid disease: sister (hyperthyroidism)      Labs from  7/1/2025 TSH: 0.69, FT4: 1.0 - off methimazole  5/2/2025 TSH: 6.4, FT4: 0.78 -on methimazole 5 mg  1/24/2025 TSH: 7.2, FT4: 0.73, TSI: 0.86  7/2/2024 TSH: undetectable, ALP: 150, A1C 6.3%  3/26/2024 TSH: 1.8   3/24/2021 TSH: 0.011, FT4: 1.27, TPO ab: undetectable, Thyroglobulin ab: undetectable     Thyroid ultrasound 4/2021 with right hyperechoic area 1.2 x 1.1 x 1.2 cm, left hyperechoic area 0.9 x 0.8 x 0.8 cm      - Denies history of radiation to head/neck  - Denies biotin supplement  Tobacco use: Denies     Prediabetes:  Labs from   5/3/2025 A1c: 6.4%  7/2/2024 A1C 6.3%     Currently regimen includes: diet controlled    The following portions of the patient's history were reviewed and updated as appropriate: allergies, current medications, past family history, past medical history, past social history, past surgical history and problem list.    Past Medical History:   Diagnosis Date    Anxiety     Hyperlipidemia     Hypertension   "    History reviewed. No pertinent surgical history.   Family History   Problem Relation Age of Onset    Heart attack Father     Breast cancer Neg Hx     Ovarian cancer Neg Hx       Social History     Socioeconomic History    Marital status:    Tobacco Use    Smoking status: Never    Smokeless tobacco: Never   Vaping Use    Vaping status: Never Used   Substance and Sexual Activity    Alcohol use: No    Drug use: No    Sexual activity: Defer      No Known Allergies   Current Outpatient Medications on File Prior to Visit   Medication Sig Dispense Refill    ASPIRIN 81 PO Take 81 mg by mouth As Needed.      atorvastatin (LIPITOR) 10 MG tablet Take 1 tablet by mouth Every Night.      CALCIUM PO Take 1 tablet by mouth Daily.      clonazePAM (KlonoPIN) 1 MG tablet Take 1 tablet by mouth Daily. TAKES 1/2 TABLET      lisinopril (PRINIVIL,ZESTRIL) 10 MG tablet Take 1 tablet by mouth Daily.      mirtazapine (REMERON SOL-TAB) 15 MG disintegrating tablet Place 1 tablet on the tongue Every Night.       No current facility-administered medications on file prior to visit.        Review of Systems   Constitutional:  Positive for fatigue. Negative for activity change, appetite change, unexpected weight gain and unexpected weight loss.   HENT:  Negative for swollen glands, trouble swallowing and voice change.    Respiratory:  Negative for shortness of breath.    Cardiovascular:  Negative for chest pain and palpitations.   Gastrointestinal:  Negative for constipation and diarrhea.   Endocrine: Negative for cold intolerance and heat intolerance.   Musculoskeletal:  Negative for myalgias.   Skin:  Negative for dry skin.   Neurological:  Negative for dizziness, tremors and numbness.   Psychiatric/Behavioral:  Negative for depressed mood. The patient is not nervous/anxious.         /70 (BP Location: Left arm, Patient Position: Sitting, Cuff Size: Adult)   Pulse 72   Ht 162.6 cm (64.02\")   Wt 81.5 kg (179 lb 9.6 oz)   SpO2 " 95%   BMI 30.81 kg/m²      Physical Exam  Constitutional:       Appearance: Normal appearance.   Neck:      Thyroid: No thyroid mass, thyromegaly or thyroid tenderness.   Cardiovascular:      Rate and Rhythm: Normal rate.   Pulmonary:      Effort: Pulmonary effort is normal.   Musculoskeletal:         General: Normal range of motion.   Skin:     General: Skin is warm and dry.   Neurological:      General: No focal deficit present.      Mental Status: She is alert and oriented to person, place, and time.   Psychiatric:         Mood and Affect: Mood normal.         Behavior: Behavior normal.         LABS AND IMAGING    CMP  Lab Results   Component Value Date    GLUCOSE 97 05/02/2025    BUN 14 05/02/2025    CREATININE 0.82 05/02/2025    EGFR 76.1 05/02/2025    BCR 17.1 05/02/2025    K 4.5 05/02/2025    CO2 24.9 05/02/2025    CALCIUM 9.4 05/02/2025    ALBUMIN 4.1 05/02/2025    AST 20 05/02/2025    ALT 16 05/02/2025        CBC w/DIFF   Lab Results   Component Value Date    WBC 7.19 12/03/2018    RBC 3.85 (L) 12/03/2018    HGB 11.1 (L) 12/03/2018    HCT 33.4 (L) 12/03/2018    MCV 86.8 12/03/2018    MCH 28.8 12/03/2018    MCHC 33.2 12/03/2018    RDW 13.2 12/03/2018    RDWSD 41.9 12/03/2018    MPV 9.8 12/03/2018     12/03/2018    NEUTRORELPCT 60.0 12/03/2018    LYMPHORELPCT 25.6 12/03/2018    MONORELPCT 8.3 12/03/2018    EOSRELPCT 5.7 (H) 12/03/2018    BASORELPCT 0.4 12/03/2018    AUTOIGPER 0.1 12/03/2018    NEUTROABS 4.31 12/03/2018    LYMPHSABS 1.84 12/03/2018    MONOSABS 0.60 12/03/2018    EOSABS 0.41 (H) 12/03/2018    BASOSABS 0.03 12/03/2018    AUTOIGNUM 0.01 12/03/2018       TSH  Lab Results   Component Value Date    TSH 6.490 (H) 05/02/2025    TSH 7.250 (H) 01/24/2025    TSH 0.546 10/27/2022       T4  Lab Results   Component Value Date    FREET4 0.78 (L) 05/02/2025    FREET4 0.73 (L) 01/24/2025    FREET4 1.17 10/27/2022       Assessment and Plan    Diagnoses and all orders for this visit:    1.  Hyperthyroidism (Primary)  Assessment & Plan:  Mild elevated TSI suggestive of grave's vs toxic multinodular goiter.  Low-normal TSH on labs earlier this month.   Discussed may need continued low-dose methimazole given drop in TSH.   Rx: Restart methimazole 5 mg twice weekly Monday and Friday.  Cautioned symptoms hypothyroidism with restarting.   Advised repeat labs in 6 weeks for continued monitoring and adjustment.   Discussed stopping if return elevated TSH.    Orders:  -     T4, Free; Future  -     TSH; Future  -     Comprehensive Metabolic Panel; Future    2. Prediabetes  -     POC Glucose, Blood  -     Hemoglobin A1c; Future    Other orders  -     Discontinue: methIMAzole (TAPAZOLE) 5 MG tablet; Take 1 tablet by mouth 2 days per week (Monday and Friday).  Dispense: 24 tablet; Refill: 0  -     methIMAzole (TAPAZOLE) 5 MG tablet; Take 1 tablet by mouth 2 days per week (Monday and Friday).  Dispense: 24 tablet; Refill: 0         Return in about 3 months (around 10/11/2025) for follow-up thyroid disease. The patient was instructed to contact the clinic with any interval questions or concerns.    Electronically signed by: Marianna Pappas PA-C   Endocrinology    Please note that portions of this note were completed with a voice recognition program.